# Patient Record
Sex: MALE | Race: WHITE | URBAN - METROPOLITAN AREA
[De-identification: names, ages, dates, MRNs, and addresses within clinical notes are randomized per-mention and may not be internally consistent; named-entity substitution may affect disease eponyms.]

---

## 2021-05-26 ENCOUNTER — RECORDS - HEALTHEAST (OUTPATIENT)
Dept: LAB | Facility: CLINIC | Age: 31
End: 2021-05-26

## 2021-05-26 LAB
SARS-COV-2 PCR COMMENT: NORMAL
SARS-COV-2 RNA SPEC QL NAA+PROBE: NEGATIVE
SARS-COV-2 VIRUS SPECIMEN SOURCE: NORMAL

## 2022-02-28 ENCOUNTER — ANCILLARY PROCEDURE (OUTPATIENT)
Dept: GENERAL RADIOLOGY | Facility: CLINIC | Age: 32
End: 2022-02-28
Attending: FAMILY MEDICINE
Payer: COMMERCIAL

## 2022-02-28 ENCOUNTER — OFFICE VISIT (OUTPATIENT)
Dept: FAMILY MEDICINE | Facility: CLINIC | Age: 32
End: 2022-02-28
Payer: COMMERCIAL

## 2022-02-28 ENCOUNTER — TELEPHONE (OUTPATIENT)
Dept: FAMILY MEDICINE | Facility: CLINIC | Age: 32
End: 2022-02-28

## 2022-02-28 VITALS
SYSTOLIC BLOOD PRESSURE: 146 MMHG | BODY MASS INDEX: 28.79 KG/M2 | HEART RATE: 76 BPM | HEIGHT: 68 IN | DIASTOLIC BLOOD PRESSURE: 72 MMHG | RESPIRATION RATE: 16 BRPM | WEIGHT: 190 LBS | TEMPERATURE: 97.9 F

## 2022-02-28 DIAGNOSIS — M25.572 PAIN IN JOINT INVOLVING ANKLE AND FOOT, LEFT: ICD-10-CM

## 2022-02-28 DIAGNOSIS — M25.572 PAIN IN JOINT INVOLVING ANKLE AND FOOT, LEFT: Primary | ICD-10-CM

## 2022-02-28 PROCEDURE — 99213 OFFICE O/P EST LOW 20 MIN: CPT | Performed by: FAMILY MEDICINE

## 2022-02-28 PROCEDURE — 73610 X-RAY EXAM OF ANKLE: CPT | Mod: LT | Performed by: RADIOLOGY

## 2022-02-28 RX ORDER — HYDROCODONE BITARTRATE AND ACETAMINOPHEN 5; 325 MG/1; MG/1
1 TABLET ORAL EVERY 6 HOURS PRN
Qty: 15 TABLET | Refills: 0 | Status: SHIPPED | OUTPATIENT
Start: 2022-02-28 | End: 2022-03-03

## 2022-02-28 ASSESSMENT — PAIN SCALES - GENERAL: PAINLEVEL: SEVERE PAIN (7)

## 2022-02-28 NOTE — LETTER
[facility]  [address] [facility] [address]  Phone: [phone]    February 28, 2022        Jf Valdez  [address] [facility] [address]          To whom it may concern:    RE: Jf Valdez    Patient was seen and treated today at our clinic.  Due to injury, Jf can only do sitting work. Minimal walking for the remainder of the week.    Please contact me for questions or concerns.      Sincerely,        Za Salazar MD

## 2022-02-28 NOTE — PROGRESS NOTES
"  Assessment & Plan     Pain in joint involving ankle and foot, left  Rest/ice/ibuprofen/ appears to be sprain, follow-up with sports med  - XR Ankle Left G/E 3 Views; Future  - HYDROcodone-acetaminophen (NORCO) 5-325 MG tablet; Take 1 tablet by mouth every 6 hours as needed for pain  - Orthopedic  Referral; Future             BMI:   Estimated body mass index is 28.89 kg/m  as calculated from the following:    Height as of this encounter: 1.727 m (5' 8\").    Weight as of this encounter: 86.2 kg (190 lb).   Weight management plan: Discussed healthy diet and exercise guidelines        No follow-ups on file.    Za Salazar MD  [facility]    Katelynn Rhoades is a 31 year old who presents for the following health issues     Musculoskeletal Problem    History of Present Illness     Reason for visit:  Left ankle  Symptom onset:  1-3 days ago    He eats 0-1 servings of fruits and vegetables daily.He consumes 1 sweetened beverage(s) daily.He exercises with enough effort to increase his heart rate 30 to 60 minutes per day.  He exercises with enough effort to increase his heart rate 5 days per week.   He is taking medications regularly.       Left ankle externally rotated.  Playing basketball  Hurts laterally  Has swelling and the start of bruising.   Unable to bear much weight on it  Did this last night      Review of Systems   Constitutional, HEENT, cardiovascular, pulmonary, gi and gu systems are negative, except as otherwise noted.      Objective    BP (!) 156/73   Pulse 76   Temp 97.9  F (36.6  C) (Oral)   Resp 16   Ht 1.727 m (5' 8\")   Wt 86.2 kg (190 lb)   BMI 28.89 kg/m    Body mass index is 28.89 kg/m .  Physical Exam   GENERAL: healthy, alert and no distress  MS: no gross musculoskeletal defects noted, laft lateral malleolus with pain to palpation just below/bruising and swelling noted, toes warm to touch    Xray - Reviewed and interpreted by me.  Left ankle: no " fracture noted

## 2022-02-28 NOTE — TELEPHONE ENCOUNTER
Forgot to give work note. Called and left a message to call back. Would like to know if he would like us to fax note. Made a copy and placed in mail.    Nadia Grant MA

## 2022-03-01 NOTE — PROGRESS NOTES
Assessment:      ICD-10-CM    1. Sprain of anterior talofibular ligament of left ankle, initial encounter  S93.492A Rolling Knee Walker Order   2. Sprain of calcaneofibular ligament of left ankle, initial encounter  S93.412A        Plan:  Orders Placed This Encounter   Procedures     Rolling Knee Walker Order       Discussed the etiology and treatment of the condition with the patient.  Imaging studies reviewed and discussed with the patient.  Discussed surgical and conservative options.  Significant lateral sprain, however severe edema & pain medially and with external rotation stress.  Would like to have him RICE and return next week for re-eval, see if MRI necessary    -Compression  -Knee scooter Rx  -Elevate  -NWB      Return:  Return in about 1 week (around 3/9/2022) for ankle sprain - :Left.    Radha Arora DPM              2  Chief Complaint:     Patient presents with:  Consult: left ankle     left ankle injury.    HPI:  Jf Valdez is a 31 year old year old male who presents for evaluation of ankle injury.    Date of injury- 02-27-22  Mechanism of injury- sports.  Occurred while playing basketball  Pain location- left ankle  Pain severity- 6/10, pain is improving.  Modifying factors- worsened by standing and improved by Rest, lying down and Norco.  Associated symptoms- redness.  Initial Treatment - NSAIDS.    Playing basketball on Sunday, jumped and came down, shows me he inverted the ankle.  Has a picture of his ankle swelling significantly on the lateral side immediately after injury.    Past Medical & Surgical History:  History reviewed. No pertinent past medical history.   No past surgical history on file.   No family history on file.     Social History:  ?  History   Smoking Status     Never Smoker   Smokeless Tobacco     Never Used     History   Drug Use Unknown     Social History    Substance and Sexual Activity      Alcohol use: Yes        Alcohol/week: 2.0 standard drinks         "Types: 2 Shots of liquor per week      Allergies:  ?   Allergies   Allergen Reactions     Pertussis Vaccines      Was told by his mother that he swelled up after given an injection with pertussis in it as a baby        Medications:    Current Outpatient Medications   Medication     HYDROcodone-acetaminophen (NORCO) 5-325 MG tablet     No current facility-administered medications for this visit.       Physical Exam:  ?  Vitals:  BP (!) 152/74 (BP Location: Left arm, Patient Position: Sitting, Cuff Size: Adult Regular)   Pulse 67   Ht 1.727 m (5' 8\")   Wt 86.2 kg (190 lb)   SpO2 99%   BMI 28.89 kg/m     General:  WD/WN, in NAD.  A&O x3.  Dermatologic:    Skin is intact, open lesions absent.   Bruising present lateral mainly.  Skin texture, turgor is normal.  Vascular:  Pulses palpable bilateral.  Digital capillary refill time normal bilateral.  Skin temperature is warmleft.  Generalized edema- none bilateral..  Focal edema- moderate ankle left..  Neurologic:    Gross sensation normal.  Gait and balance abnormal, NWB.  Musculoskeletal:  Maximal pain to palpation of peroneals just distal to retrofibular groove, left.  moderate pain to palpation of ATFL, CFL, medial ankle gutter, anterior deltoid, left  Mild pain to 1  Ankle ROM guarding, painful  Left.  External rotation stress stable, painful, L  Tib/Fib squeeze stable, not painful, L  Manual Muscle Testing of PB  painful  4/5  Left, PL painful, 5/5 L.    Patient is not able to bear weight on the injured extremity.  Patient is not able to walk on the injured extremity.    Imaging:  x-ray independently reviewed and interpreted by myself today.  Non-Weight-bearing views left ankle dated 2/28/22, reveal no fracture or degeneration, NWB films          "

## 2022-03-02 ENCOUNTER — OFFICE VISIT (OUTPATIENT)
Dept: PODIATRY | Facility: CLINIC | Age: 32
End: 2022-03-02
Attending: FAMILY MEDICINE
Payer: COMMERCIAL

## 2022-03-02 VITALS
SYSTOLIC BLOOD PRESSURE: 152 MMHG | OXYGEN SATURATION: 99 % | BODY MASS INDEX: 28.79 KG/M2 | HEART RATE: 67 BPM | HEIGHT: 68 IN | DIASTOLIC BLOOD PRESSURE: 74 MMHG | WEIGHT: 190 LBS

## 2022-03-02 DIAGNOSIS — S93.412A SPRAIN OF CALCANEOFIBULAR LIGAMENT OF LEFT ANKLE, INITIAL ENCOUNTER: ICD-10-CM

## 2022-03-02 DIAGNOSIS — S93.492A SPRAIN OF ANTERIOR TALOFIBULAR LIGAMENT OF LEFT ANKLE, INITIAL ENCOUNTER: Primary | ICD-10-CM

## 2022-03-02 PROCEDURE — 99203 OFFICE O/P NEW LOW 30 MIN: CPT | Performed by: PODIATRIST

## 2022-03-02 ASSESSMENT — PAIN SCALES - GENERAL: PAINLEVEL: SEVERE PAIN (6)

## 2022-03-02 NOTE — LETTER
3/2/2022         RE: Jf Valdez  [address] [facility] [address]        Dear Colleague,    Thank you for referring your patient, Jf Valdez, to the [facility]. Please see a copy of my visit note below.    Assessment:      ICD-10-CM    1. Sprain of anterior talofibular ligament of left ankle, initial encounter  S93.492A Rolling Knee Walker Order   2. Sprain of calcaneofibular ligament of left ankle, initial encounter  S93.412A        Plan:  Orders Placed This Encounter   Procedures     Rolling Knee Walker Order       Discussed the etiology and treatment of the condition with the patient.  Imaging studies reviewed and discussed with the patient.  Discussed surgical and conservative options.  Significant lateral sprain, however severe edema & pain medially and with external rotation stress.  Would like to have him RICE and return next week for re-eval, see if MRI necessary    -Compression  -Knee scooter Rx  -Elevate  -NWB      Return:  Return in about 1 week (around 3/9/2022) for ankle sprain - :Left.    Radha Arora DPM              2  Chief Complaint:     Patient presents with:  Consult: left ankle     left ankle injury.    HPI:  Jf Valdez is a 31 year old year old male who presents for evaluation of ankle injury.    Date of injury- 02-27-22  Mechanism of injury- sports.  Occurred while playing basketball  Pain location- left ankle  Pain severity- 6/10, pain is improving.  Modifying factors- worsened by standing and improved by Rest, lying down and Norco.  Associated symptoms- redness.  Initial Treatment - NSAIDS.    Playing basketball on Sunday, jumped and came down, shows me he inverted the ankle.  Has a picture of his ankle swelling significantly on the lateral side immediately after injury.    Past Medical & Surgical History:  History reviewed. No pertinent past medical history.   No past surgical history on file.   No family history on file.  "    Social History:  ?  History   Smoking Status     Never Smoker   Smokeless Tobacco     Never Used     History   Drug Use Unknown     Social History    Substance and Sexual Activity      Alcohol use: Yes        Alcohol/week: 2.0 standard drinks        Types: 2 Shots of liquor per week      Allergies:  ?   Allergies   Allergen Reactions     Pertussis Vaccines      Was told by his mother that he swelled up after given an injection with pertussis in it as a baby        Medications:    Current Outpatient Medications   Medication     HYDROcodone-acetaminophen (NORCO) 5-325 MG tablet     No current facility-administered medications for this visit.       Physical Exam:  ?  Vitals:  BP (!) 152/74 (BP Location: Left arm, Patient Position: Sitting, Cuff Size: Adult Regular)   Pulse 67   Ht 1.727 m (5' 8\")   Wt 86.2 kg (190 lb)   SpO2 99%   BMI 28.89 kg/m     General:  WD/WN, in NAD.  A&O x3.  Dermatologic:    Skin is intact, open lesions absent.   Bruising present lateral mainly.  Skin texture, turgor is normal.  Vascular:  Pulses palpable bilateral.  Digital capillary refill time normal bilateral.  Skin temperature is warmleft.  Generalized edema- none bilateral..  Focal edema- moderate ankle left..  Neurologic:    Gross sensation normal.  Gait and balance abnormal, NWB.  Musculoskeletal:  Maximal pain to palpation of peroneals just distal to retrofibular groove, left.  moderate pain to palpation of ATFL, CFL, medial ankle gutter, anterior deltoid, left  Mild pain to 1  Ankle ROM guarding, painful  Left.  External rotation stress stable, painful, L  Tib/Fib squeeze stable, not painful, L  Manual Muscle Testing of PB  painful  4/5  Left, PL painful, 5/5 L.    Patient is not able to bear weight on the injured extremity.  Patient is not able to walk on the injured extremity.    Imaging:  x-ray independently reviewed and interpreted by myself today.  Non-Weight-bearing views left ankle dated 2/28/22, reveal no fracture or " degeneration, NWB films              Again, thank you for allowing me to participate in the care of your patient.        Sincerely,        Radha Arora DPM

## 2022-03-02 NOTE — PATIENT INSTRUCTIONS
PATIENT INSTRUCTIONS - Podiatry / Foot & Ankle Surgery      Elevate  Compression  No weight for next 1 week      [facility]  [place]  [address]   [place] [address]  Ph: [phone]  Fax: [phone] [place] [facility]  [address]  [place] [address]  Ph: [phone]  Fax: [phone]   [place]  [address]   [place] [address]  Ph: [phone]  Fax: [phone] [place]  [address]  [place] [address]   Ph: [phone]    [place]  [address]  [place] [address]   Ph: [phone]  [place]  [address] [place] [address]   Ph: [phone]      [place]  [address]  [place] [address]  Ph: [phone]  [place]  [address]   [place] [address]  Ph: [phone]  Fax: [phone]       *OUTSIDE STORE OPTIONS*  [facility]   [address]  [place], [place] [id number] [phone] [facility] [place]  [address]   [place], [place] [address] [phone] [facility] [place]  [address]  [place] [address] [phone]     ** Alternative Knee Scooter Rental/Purchase: [facility] [place]  [phone]  [email or web address]

## 2022-03-02 NOTE — LETTER
[facility]  [address]  [place] [address]  Phone: [phone]    March 2, 2022        Jf Valdez  [address] [facility] [address]          To whom it may concern:      Patient was seen and treated today at our clinic for Left ankle/foot injury DOI: 2/27/2022.  When the patient returns to work, the following restrictions apply until next week 3/9/2022. Knee Scooter.     He will return 39/2022  to evaluate healing and determine future work restrictions.  Thank you.           While on narcotics the patient should avoid driving, operating machinery, climbing, and alcohol.        Please contact me for questions or concerns.      Sincerely,      Provider Electronic Signature (as below)    Radha Arora DPM

## 2022-03-09 ENCOUNTER — OFFICE VISIT (OUTPATIENT)
Dept: PODIATRY | Facility: CLINIC | Age: 32
End: 2022-03-09
Payer: COMMERCIAL

## 2022-03-09 VITALS
HEART RATE: 72 BPM | SYSTOLIC BLOOD PRESSURE: 109 MMHG | HEIGHT: 68 IN | BODY MASS INDEX: 28.79 KG/M2 | RESPIRATION RATE: 16 BRPM | DIASTOLIC BLOOD PRESSURE: 82 MMHG | WEIGHT: 190 LBS | OXYGEN SATURATION: 96 %

## 2022-03-09 DIAGNOSIS — S93.422A SPRAIN OF DELTOID LIGAMENT OF LEFT ANKLE, INITIAL ENCOUNTER: ICD-10-CM

## 2022-03-09 DIAGNOSIS — S93.492A SPRAIN OF ANTERIOR TALOFIBULAR LIGAMENT OF LEFT ANKLE, INITIAL ENCOUNTER: Primary | ICD-10-CM

## 2022-03-09 DIAGNOSIS — S93.412A SPRAIN OF CALCANEOFIBULAR LIGAMENT OF LEFT ANKLE, INITIAL ENCOUNTER: ICD-10-CM

## 2022-03-09 DIAGNOSIS — M76.72 PERONEAL TENDONITIS, LEFT: ICD-10-CM

## 2022-03-09 PROCEDURE — 99213 OFFICE O/P EST LOW 20 MIN: CPT | Performed by: PODIATRIST

## 2022-03-09 ASSESSMENT — PAIN SCALES - GENERAL: PAINLEVEL: MODERATE PAIN (5)

## 2022-03-09 NOTE — PATIENT INSTRUCTIONS
PATIENT INSTRUCTIONS - Podiatry / Foot & Ankle Surgery    Boot Immobilization:  Wear the boot at all times when walking or standing.  Wear the boot for 2-4 weeks, or 1 week beyond resolution of pain.  You may remove the boot when at rest for ankle and toe motion & for bathing/showering.  Do not drive in the boot; wear the boot to & from the car, then switch to a stiff soled shoe for driving.  You do not need to sleep in the boot.  Wear compression (ACE bandage or Tubigrip) under the boot to decrease swelling.  moinoimal activity in the boot until follow-up      Ankle brace dispensed today, transition to this after the boot     Physical Therapy  You have been referred to [facility].  Locations can be found online.  Please call to schedule an appointment:  [phone]      Do not attempt impact (running, jumping) until beyond 3 month apolonia

## 2022-03-09 NOTE — LETTER
3/9/2022         RE: Jf Valdez  [address] [facility] [address]        Dear Colleague,    Thank you for referring your patient, Jf Valdez, to the [facility]. Please see a copy of my visit note below.    Assessment:      ICD-10-CM    1. Sprain of anterior talofibular ligament of left ankle, initial encounter  S93.492A Ankle/Foot Bracing Supplies Order     Ankle/Foot Bracing Supplies Order     Physical Therapy Referral   2. Sprain of calcaneofibular ligament of left ankle, initial encounter  S93.412A Ankle/Foot Bracing Supplies Order     Ankle/Foot Bracing Supplies Order     Physical Therapy Referral   3. Sprain of deltoid ligament of left ankle, initial encounter  S93.422A Ankle/Foot Bracing Supplies Order     Ankle/Foot Bracing Supplies Order     Physical Therapy Referral   4. Peroneal tendonitis, left  M76.72 Ankle/Foot Bracing Supplies Order     Ankle/Foot Bracing Supplies Order     Physical Therapy Referral       Plan:  Orders Placed This Encounter   Procedures     Physical Therapy Referral     Ankle/Foot Bracing Supplies Order     Ankle/Foot Bracing Supplies Order       Discussed the etiology and treatment of the condition with the patient.  Imaging studies reviewed and discussed with the patient.  Discussed surgical and conservative options.  Significant lateral sprain, likely superficial deltoid injury but stress exams of deltoid stable today.    -Rx PT  -Boot dispensed per pt request  -ASO brace dispensed for after boot  -COmression under both  -Limit impact activity - for 3 mo    Discussed signs that would indicate need for return evaluation    Return:  Return in about 6 weeks (around 4/20/2022), or if symptoms worsen or fail to improve, for ankle sprain left.    Radha Arora DPM                Chief Complaint:     Patient presents with:  Follow Up: Left ankle     left ankle injury.    HPI:  Jf Valdez is a 31 year old year old male who  "presents for evaluation of ankle injury.    Date of injury- 02-27-22  Mechanism of injury- sports.  Occurred while playing basketball  Pain location- left ankle  Pain severity- 6/10, pain is improving.  Modifying factors- worsened by standing and improved by Rest, lying down and Norco.  Associated symptoms- redness.  Initial Treatment - NSAIDS.    Feeling much  Better today.  Swelling has come down.  Numbness is improving slowly.    Past Medical & Surgical History:  No past medical history on file.   No past surgical history on file.   No family history on file.     Social History:  ?  History   Smoking Status     Never Smoker   Smokeless Tobacco     Never Used     History   Drug Use Unknown     Social History    Substance and Sexual Activity      Alcohol use: Yes        Alcohol/week: 2.0 standard drinks        Types: 2 Shots of liquor per week      Allergies:  ?   Allergies   Allergen Reactions     Pertussis Vaccines      Was told by his mother that he swelled up after given an injection with pertussis in it as a baby        Medications:    No current outpatient medications on file.     No current facility-administered medications for this visit.       Physical Exam:  ?  Vitals:  /82   Pulse 72   Resp 16   Ht 1.727 m (5' 8\")   Wt 86.2 kg (190 lb)   SpO2 96%   BMI 28.89 kg/m     General:  WD/WN, in NAD.  A&O x3.  Dermatologic:    Skin is intact, open lesions absent.   Bruising present lateral mainly.  Skin texture, turgor is normal.  Vascular:  Pulses palpable bilateral.  Digital capillary refill time normal bilateral.  Skin temperature is warmleft.  Generalized edema- none bilateral..  Focal edema- moderate ankle left..  Neurologic:    Gross sensation normal.  Gait and balance abnormal, NWB.  Musculoskeletal:  Maximal pain to palpation of  ATFL, CFL, peroneals, anterior deltoid. left.  moderate pain to palpation of, medial ankle gutter, anterior deltoid, left  Ankle ROM smooth, painful, Left.  Anterior " drawer mildly increased, talar tilt stable, Left.  External rotation stress stable, pain free, L  Manual Muscle Testing of PB  painful  4/5  Left, PL painful, 5/5 L.    Patient is  able to bear weight on the injured extremity.  Patient is  able to walk on the injured extremity.    Imaging:  x-ray independently reviewed and interpreted by myself today.  Non-Weight-bearing views left ankle dated 2/28/22, reveal no fracture or degeneration, NWB films. Talar neck avulsion visible on MO in area of superfical deltoid insertion, likely superficial deltoid injury.            Again, thank you for allowing me to participate in the care of your patient.        Sincerely,        Radha Arora DPM

## 2022-03-09 NOTE — LETTER
[facility]  [address]  [place] [address]  Phone: [phone]    March 9, 2022        Jf Valdez  [address] [facility] [address]          To whom it may concern:      Patient was seen and treated today at our clinic for Left ankle/foot injury DOI: 2/27/2022.  When the patient returns to work, the following restrictions apply for the next six weeks. Knee Scooter/ boot fx, and ankle brace.  He will return in 6 weeks to evaluate healing and determine future work restrictions.  Thank you.           While on narcotics the patient should avoid driving, operating machinery, climbing, and alcohol.        Please contact me for questions or concerns.      Sincerely,      Provider Electronic Signature (as below)  Radha Arora DPM

## 2022-03-09 NOTE — PROGRESS NOTES
Assessment:      ICD-10-CM    1. Sprain of anterior talofibular ligament of left ankle, initial encounter  S93.492A Ankle/Foot Bracing Supplies Order     Ankle/Foot Bracing Supplies Order     Physical Therapy Referral   2. Sprain of calcaneofibular ligament of left ankle, initial encounter  S93.412A Ankle/Foot Bracing Supplies Order     Ankle/Foot Bracing Supplies Order     Physical Therapy Referral   3. Sprain of deltoid ligament of left ankle, initial encounter  S93.422A Ankle/Foot Bracing Supplies Order     Ankle/Foot Bracing Supplies Order     Physical Therapy Referral   4. Peroneal tendonitis, left  M76.72 Ankle/Foot Bracing Supplies Order     Ankle/Foot Bracing Supplies Order     Physical Therapy Referral       Plan:  Orders Placed This Encounter   Procedures     Physical Therapy Referral     Ankle/Foot Bracing Supplies Order     Ankle/Foot Bracing Supplies Order       Discussed the etiology and treatment of the condition with the patient.  Imaging studies reviewed and discussed with the patient.  Discussed surgical and conservative options.  Significant lateral sprain, likely superficial deltoid injury but stress exams of deltoid stable today.    -Rx PT  -Boot dispensed per pt request  -ASO brace dispensed for after boot  -COmression under both  -Limit impact activity - for 3 mo    Discussed signs that would indicate need for return evaluation    Return:  Return in about 6 weeks (around 4/20/2022), or if symptoms worsen or fail to improve, for ankle sprain left.    Radha Arora DPM                Chief Complaint:     Patient presents with:  Follow Up: Left ankle     left ankle injury.    HPI:  Jf Valdez is a 31 year old year old male who presents for evaluation of ankle injury.    Date of injury- 02-27-22  Mechanism of injury- sports.  Occurred while playing basketball  Pain location- left ankle  Pain severity- 6/10, pain is improving.  Modifying factors- worsened by standing and improved by  "Rest, lying down and Norco.  Associated symptoms- redness.  Initial Treatment - NSAIDS.    Feeling much  Better today.  Swelling has come down.  Numbness is improving slowly.    Past Medical & Surgical History:  No past medical history on file.   No past surgical history on file.   No family history on file.     Social History:  ?  History   Smoking Status     Never Smoker   Smokeless Tobacco     Never Used     History   Drug Use Unknown     Social History    Substance and Sexual Activity      Alcohol use: Yes        Alcohol/week: 2.0 standard drinks        Types: 2 Shots of liquor per week      Allergies:  ?   Allergies   Allergen Reactions     Pertussis Vaccines      Was told by his mother that he swelled up after given an injection with pertussis in it as a baby        Medications:    No current outpatient medications on file.     No current facility-administered medications for this visit.       Physical Exam:  ?  Vitals:  /82   Pulse 72   Resp 16   Ht 1.727 m (5' 8\")   Wt 86.2 kg (190 lb)   SpO2 96%   BMI 28.89 kg/m     General:  WD/WN, in NAD.  A&O x3.  Dermatologic:    Skin is intact, open lesions absent.   Bruising present lateral mainly.  Skin texture, turgor is normal.  Vascular:  Pulses palpable bilateral.  Digital capillary refill time normal bilateral.  Skin temperature is warmleft.  Generalized edema- none bilateral..  Focal edema- moderate ankle left..  Neurologic:    Gross sensation normal.  Gait and balance abnormal, NWB.  Musculoskeletal:  Maximal pain to palpation of  ATFL, CFL, peroneals, anterior deltoid. left.  moderate pain to palpation of, medial ankle gutter, anterior deltoid, left  Ankle ROM smooth, painful, Left.  Anterior drawer mildly increased, talar tilt stable, Left.  External rotation stress stable, pain free, L  Manual Muscle Testing of PB  painful  4/5  Left, PL painful, 5/5 L.    Patient is  able to bear weight on the injured extremity.  Patient is  able to walk on the " injured extremity.    Imaging:  x-ray independently reviewed and interpreted by myself today.  Non-Weight-bearing views left ankle dated 2/28/22, reveal no fracture or degeneration, NWB films. Talar neck avulsion visible on MO in area of superfical deltoid insertion, likely superficial deltoid injury.

## 2022-03-11 ENCOUNTER — MYC REFILL (OUTPATIENT)
Dept: FAMILY MEDICINE | Facility: CLINIC | Age: 32
End: 2022-03-11
Payer: COMMERCIAL

## 2022-03-11 DIAGNOSIS — S93.492A SPRAIN OF ANTERIOR TALOFIBULAR LIGAMENT OF LEFT ANKLE, INITIAL ENCOUNTER: Primary | ICD-10-CM

## 2022-03-11 DIAGNOSIS — M25.572 PAIN IN JOINT INVOLVING ANKLE AND FOOT, LEFT: ICD-10-CM

## 2022-03-11 RX ORDER — HYDROCODONE BITARTRATE AND ACETAMINOPHEN 5; 325 MG/1; MG/1
1 TABLET ORAL EVERY 6 HOURS PRN
Qty: 15 TABLET | Refills: 0 | OUTPATIENT
Start: 2022-03-11

## 2022-03-11 NOTE — TELEPHONE ENCOUNTER
I am no longer managing his ankle sprain. I saw him once  He needs to get refills from his podiatrist    Za Salazar MD

## 2022-03-11 NOTE — TELEPHONE ENCOUNTER
Pt calling regarding refill request below. RN advised request will be forwarded to provider to review and advise.  Pt states he was seen by Foot and Ankle department this week, but did not request a refill from that provider because he thought it was not appropriate to get refills from more than 1 provider.    Sally Sanchez, KRISTAN, RN

## 2022-03-12 ENCOUNTER — HEALTH MAINTENANCE LETTER (OUTPATIENT)
Age: 32
End: 2022-03-12

## 2022-03-24 ENCOUNTER — THERAPY VISIT (OUTPATIENT)
Dept: PHYSICAL THERAPY | Facility: CLINIC | Age: 32
End: 2022-03-24
Attending: PODIATRIST
Payer: COMMERCIAL

## 2022-03-24 DIAGNOSIS — S93.422A SPRAIN OF DELTOID LIGAMENT OF LEFT ANKLE, INITIAL ENCOUNTER: ICD-10-CM

## 2022-03-24 DIAGNOSIS — M76.72 PERONEAL TENDONITIS, LEFT: ICD-10-CM

## 2022-03-24 DIAGNOSIS — S93.492A SPRAIN OF ANTERIOR TALOFIBULAR LIGAMENT OF LEFT ANKLE, INITIAL ENCOUNTER: ICD-10-CM

## 2022-03-24 DIAGNOSIS — S93.412A SPRAIN OF CALCANEOFIBULAR LIGAMENT OF LEFT ANKLE, INITIAL ENCOUNTER: ICD-10-CM

## 2022-03-24 DIAGNOSIS — M25.572 PAIN IN JOINT INVOLVING ANKLE AND FOOT, LEFT: ICD-10-CM

## 2022-03-24 DIAGNOSIS — R26.9 IMPAIRED GAIT: ICD-10-CM

## 2022-03-24 PROCEDURE — 97161 PT EVAL LOW COMPLEX 20 MIN: CPT | Mod: GP | Performed by: PHYSICAL THERAPIST

## 2022-03-24 PROCEDURE — 97110 THERAPEUTIC EXERCISES: CPT | Mod: GP | Performed by: PHYSICAL THERAPIST

## 2022-03-24 NOTE — PROGRESS NOTES
Physical Therapy Initial Evaluation  Subjective:  The history is provided by the patient and medical records. No  was used.   Patient Health History  Jf Valdez being seen for L lateral ankle sprain.     Problem began: 2/27/2022.   Problem occurred: Playing basketball   Pain is reported as 5/10 on pain scale.  General health as reported by patient is good.  Pertinent medical history includes: numbness/tingling.   Red flags:  Calf pain-swelling-warmth (calf pain d/t condition; (-) signs for DVT).             Current occupation is ; lives with wife & 2 daughters ages 7 & 14, both play softball.   Primary job tasks include:  Lifting/carrying, prolonged standing, repetitive tasks and pushing/pulling.                  Therapist Generated HPI Evaluation  Problem details: Jf is a 32 y/o male referred to skilled therapy for evaluation and treatment of L ankle pain secondary to L lateral ankle sprain and damage to lateral and medial ankle ligaments. NANO: playing basketball and landed on inverted L foot, R foot landed on top of L medial ankle, causing injury to lateral and medial sides of the ankle. Patient has been prescribed a boot for 4 weeks and is instructed to transition to lace-up brace for 3 more weeks following conclusion of the boot. As of today, he has one more week in the boot. Patient is WBAT on his L foot with the boot on. He wears the boot majority of the day and takes it off at home. Only ambulates from [id number] without the boot. He uses a knee scooter at work as needed. Patient notes pain on dorsal aspect of ankle, and contributes it to uncomfortable positioning of the boot. Jf is an active individual and would like to be able to return to running activities and participating in sports with his two daughters..         Type of problem:  Left ankle.    This is a new condition.  Condition occurred with:  A wrong landing.  Where condition occurred:  during recreation/sport.  Patient reports pain:  Joint, anterior, medial, lateral and metatarsal heads (some tingling in ball of foot with stairs, but diminishes upon completion).  Pain is described as cramping and sharp and is intermittent.  Pain radiates to:  Foot.   Since onset symptoms are gradually improving.  Associated symptoms:  Numbness, tingling, edema and loss of motion/stiffness. Symptoms are exacerbated by ascending stairs, descending stairs, walking and weight bearing (DF)  and relieved by bracing/immobilizing and ice.  Special tests included:  X-ray (no fracture or degeneration; talar neck avulsion at superficial deltoid attachment).    Restrictions due to condition include:  Working in normal job with restrictions.                          Objective:    Gait:    Gait Type:  Antalgic   Assistive Devices:  CAM            Ankle/Foot Evaluation  ROM:    AROM:    Dorsiflexion:  Left:   5  Right:   5  Plantarflexion:  Left:  65    Right:  63  Inversion:  Left:  20 +pain     Right:  28  Eversion:  17 +pain     Right:  16        Strength:    Dorsiflexion:  Left: 5/5     Pain:   Right: 5/5   Pain:          Extension Great Toe:Left: 5/5  Pain:  Right: 5/5  Pain:                  PALPATION: Palpation of ankle: TTP along shaft of L 5th metatarsal.  Left ankle tenderness present at:  deltoid ligament; anterior talofibular ligament; posterior talofibular ligament and calcaneofibular ligament  Left ankle tenderness not present at:   gastroc/soleus; achilles tendon; medial malleolus or lateral malleolus    EDEMA:   Left ankle edema present at: 53  Left ankle edema not present at: forefoot  Right ankle edema present at:  50  Right ankle edema not present at:  forefoot      Figure 8 left: 53Figure 8 right: 50                                           Hip Evaluation    Hip Strength:    Flexion:   Left: 5/5   Pain:  Right: 5/5   Pain:                                                 Knee Evaluation:  ROM:             Strength:     Extension:  Left: 5/5   Pain:      Right: 5/5   Pain:  Flexion:  Left: 5/5   Pain:      Right: 5/5   Pain:                        General     ROS    Assessment/Plan:    Patient is a 31 year old male with left side ankle complaints.    Patient has the following significant findings with corresponding treatment plan.                Diagnosis 1:  L lateral ankle sprain  Pain -  hot/cold therapy, US, manual therapy, splint/taping/bracing/orthotics, self management, education and home program  Decreased ROM/flexibility - manual therapy, therapeutic exercise, therapeutic activity and home program  Edema - vasopneumatics, cold therapy and self management/home program  Impaired gait - gait training and home program  Impaired muscle performance - neuro re-education and home program  Decreased function - therapeutic activities and home program    Therapy Evaluation Codes:   1) History comprised of:   Personal factors that impact the plan of care:      Profession and activity level.    Comorbidity factors that impact the plan of care are:      None.     Medications impacting care: None.  2) Examination of Body Systems comprised of:   Body structures and functions that impact the plan of care:      Ankle and foot.   Activity limitations that impact the plan of care are:      Dressing, Jumping, Running, Sports, Squatting/kneeling, Stairs, Standing, Walking and Working.  3) Clinical presentation characteristics are:   Stable/Uncomplicated.  4) Decision-Making    Low complexity using standardized patient assessment instrument and/or measureable assessment of functional outcome.  Cumulative Therapy Evaluation is: Low complexity.    Previous and current functional limitations:  (See Goal Flow Sheet for this information)    Short term and Long term goals: (See Goal Flow Sheet for this information)     Communication ability:  Patient appears to be able to clearly communicate and understand verbal and written  communication and follow directions correctly.  Treatment Explanation - The following has been discussed with the patient:   RX ordered/plan of care  Anticipated outcomes  Possible risks and side effects  This patient would benefit from PT intervention to resume normal activities.   Rehab potential is excellent.    Frequency:  1 X week, once daily  Duration:  for 4 weeks, then 2x/month for 2 months  Discharge Plan:  Achieve all LTG.  Independent in home treatment program.  Return to previous functional level by discharge.  Reach maximal therapeutic benefit.    Please refer to the daily flowsheet for treatment today, total treatment time and time spent performing 1:1 timed codes.     Amanda Hilligoss, HARIT; Misti Coronado, SPT

## 2022-08-01 PROBLEM — M25.572 PAIN IN JOINT INVOLVING ANKLE AND FOOT, LEFT: Status: RESOLVED | Noted: 2022-03-24 | Resolved: 2022-08-01

## 2022-08-01 PROBLEM — R26.9 IMPAIRED GAIT: Status: RESOLVED | Noted: 2022-03-24 | Resolved: 2022-08-01

## 2022-09-28 ENCOUNTER — OFFICE VISIT (OUTPATIENT)
Dept: FAMILY MEDICINE | Facility: OTHER | Age: 32
End: 2022-09-28
Payer: COMMERCIAL

## 2022-09-28 VITALS
OXYGEN SATURATION: 100 % | RESPIRATION RATE: 16 BRPM | HEART RATE: 83 BPM | DIASTOLIC BLOOD PRESSURE: 78 MMHG | BODY MASS INDEX: 26.76 KG/M2 | WEIGHT: 176 LBS | TEMPERATURE: 96.9 F | SYSTOLIC BLOOD PRESSURE: 132 MMHG

## 2022-09-28 DIAGNOSIS — M25.511 CHRONIC PAIN OF BOTH SHOULDERS: Primary | ICD-10-CM

## 2022-09-28 DIAGNOSIS — M25.552 HIP PAIN, LEFT: ICD-10-CM

## 2022-09-28 DIAGNOSIS — M25.512 CHRONIC PAIN OF BOTH SHOULDERS: Primary | ICD-10-CM

## 2022-09-28 DIAGNOSIS — G89.29 CHRONIC PAIN OF BOTH SHOULDERS: Primary | ICD-10-CM

## 2022-09-28 DIAGNOSIS — S43.432A LABRAL TEAR OF SHOULDER, LEFT, INITIAL ENCOUNTER: ICD-10-CM

## 2022-09-28 PROCEDURE — 99214 OFFICE O/P EST MOD 30 MIN: CPT | Performed by: STUDENT IN AN ORGANIZED HEALTH CARE EDUCATION/TRAINING PROGRAM

## 2022-09-28 ASSESSMENT — PAIN SCALES - GENERAL: PAINLEVEL: SEVERE PAIN (6)

## 2022-09-28 NOTE — PROGRESS NOTES
Assessment & Plan     Chronic pain of both shoulders  Labral tear of shoulder, left, subsequent encounter, anterior-inferior  Hip pain, left  Subtle detachment of left rectus/abdominis/adductor aponeurosis  Please refer to MRI 2019 and 2021.  Ongoing left hip and bilateral shoulder pain in particular left side.  Previous injuries from sport related activities.  Has been in physical therapy for his left hip in the past.  Has some ongoing discomfort.  Has not done physical therapy for his left shoulder in the past.  We discussed about orthopedic referral, therapy, stretches, OTC treatments.  For now patient wishes to defer referrals for now, will trial some at home exercises/stretches with OTC treatments.  Would have a low threshold to send him to physical therapy though.  He will let me know.    I have spent 30 or more minutes face-to-face with the patient and coordinating care such as reviewing documentation, results, or having discussions over the phone.        ANDRE SCHUMACHER MD  [facility] COLLEEN Dias is a 31 year old, presenting for the following health issues:  Musculoskeletal Problem      Musculoskeletal Problem    History of Present Illness       Back Pain:  He presents for follow up of back pain. Patient's back pain is a new problem.    Original cause of back pain: other  First noticed back pain: 1-4 weeks ago  Patient feels back pain: constantlyLocation of back pain:  Right lower back, left lower back, right middle of back and left middle of back  Description of back pain: dull ache, sharp and stabbing  Back pain spreads: right buttocks, left buttocks, right thigh and left thigh    Since patient first noticed back pain, pain is: unchanged  Does back pain interfere with his job:  Yes  On a scale of 1-10 (10 being the worst), patient describes pain as:  7  What makes back pain worse: bending, certain positions, lying down, sitting, standing and twisting  Acupuncture:  not tried  Acetaminophen: not helpful  Activity or exercise: not helpful  Chiropractor:  Not tried  Cold: not tried  Heat: helpful  Massage: not tried  Muscle relaxants: helpful  NSAIDS: not helpful  Opioids: not tried  Physical Therapy: not tried  Rest: helpful  Steroid Injection: not tried  Stretching: not helpful  Surgery: not tried  TENS unit: not tried  Topical pain relievers: not tried  Other healthcare providers patient is seeing for back pain: None    He eats 0-1 servings of fruits and vegetables daily.He consumes 1 sweetened beverage(s) daily.He exercises with enough effort to increase his heart rate 30 to 60 minutes per day.  He exercises with enough effort to increase his heart rate 4 days per week.   He is taking medications regularly.         Review of Systems   Constitutional, HEENT, cardiovascular, pulmonary, gi and gu systems are negative, except as otherwise noted.      Objective    There were no vitals taken for this visit.  There is no height or weight on file to calculate BMI.  Physical Exam  Vitals and nursing note reviewed.   Constitutional:       General: He is not in acute distress.     Appearance: Normal appearance. He is not ill-appearing, toxic-appearing or diaphoretic.   HENT:      Head: Normocephalic and atraumatic.      Right Ear: Tympanic membrane, ear canal and external ear normal. There is no impacted cerumen.      Left Ear: Tympanic membrane, ear canal and external ear normal. There is no impacted cerumen.      Nose: Nose normal. No congestion or rhinorrhea.      Mouth/Throat:      Mouth: Mucous membranes are moist.      Pharynx: Oropharynx is clear. No oropharyngeal exudate or posterior oropharyngeal erythema.   Eyes:      General:         Right eye: No discharge.         Left eye: No discharge.      Extraocular Movements: Extraocular movements intact.      Conjunctiva/sclera: Conjunctivae normal.      Pupils: Pupils are equal, round, and reactive to light.   Cardiovascular:       Rate and Rhythm: Normal rate and regular rhythm.      Heart sounds: No murmur heard.  Pulmonary:      Effort: Pulmonary effort is normal. No respiratory distress.      Breath sounds: Normal breath sounds.   Musculoskeletal:         General: Normal range of motion.      Cervical back: Normal range of motion and neck supple.      Comments: Negative logroll left side.  Normal range of motion of left hip.  Some tenderness over the bilateral SI joints.  Slight tenderness over the left greater trochanter.  Normal sensation of bilateral lower extremities and symmetric.  2+ patellar and Achilles reflexes.   Lymphadenopathy:      Cervical: No cervical adenopathy.   Skin:     General: Skin is warm.   Neurological:      Mental Status: He is alert and oriented to person, place, and time.   Psychiatric:         Mood and Affect: Mood normal.         Behavior: Behavior normal.         Thought Content: Thought content normal.         Judgment: Judgment normal.

## 2022-09-28 NOTE — PATIENT INSTRUCTIONS
Tylenol and ibuprofen as needed  Ice (20 min at a time)  Any topicals that are over the counter  (Voltaren gel, Aspercreme, etc...)  Chiropractic

## 2023-03-29 ENCOUNTER — OFFICE VISIT (OUTPATIENT)
Dept: FAMILY MEDICINE | Facility: CLINIC | Age: 33
End: 2023-03-29
Payer: COMMERCIAL

## 2023-03-29 VITALS
WEIGHT: 178 LBS | DIASTOLIC BLOOD PRESSURE: 84 MMHG | BODY MASS INDEX: 26.98 KG/M2 | RESPIRATION RATE: 20 BRPM | OXYGEN SATURATION: 98 % | SYSTOLIC BLOOD PRESSURE: 131 MMHG | TEMPERATURE: 97.4 F | HEART RATE: 103 BPM | HEIGHT: 68 IN

## 2023-03-29 DIAGNOSIS — H92.01 EAR DISCOMFORT, RIGHT: Primary | ICD-10-CM

## 2023-03-29 PROCEDURE — 99213 OFFICE O/P EST LOW 20 MIN: CPT | Performed by: PHYSICIAN ASSISTANT

## 2023-03-29 ASSESSMENT — PAIN SCALES - GENERAL: PAINLEVEL: NO PAIN (0)

## 2023-03-29 NOTE — PROGRESS NOTES
"  Assessment & Plan       ICD-10-CM    1. Ear discomfort, right  H92.01       Talk to patient about his concerns I had my MA irrigate his right ear out after that I did not see any hair in his ear.  It is possible that was causing him his symptoms we will have to wait and see you.  We talked about possible eustachian tube dysfunction symptoms also.  He is get a monitor this over the next month if his symptoms persist he will follow-up at that time.      Lew Posey PA-C  [facility]   Arun is a 32 year old, presenting for the following health issues:  Ear Problem    Additional Questions 3/29/2023   Roomed by Yomaira MOMIN   Accompanied by Self     Patient Reported Additional Medications 3/29/2023   Patient reports taking the following new medications no change     History of Present Illness       Reason for visit:  Ear  Symptom onset:  1-2 weeks ago  Symptoms include:  Random noise at random times  Symptom intensity:  Mild  Symptom progression:  Staying the same  Had these symptoms before:  No  What makes it worse:  No    He eats 0-1 servings of fruits and vegetables daily.He consumes 0 sweetened beverage(s) daily.He exercises with enough effort to increase his heart rate 30 to 60 minutes per day.  He exercises with enough effort to increase his heart rate 5 days per week.   He is taking medications regularly.   started with \"bad cold\" about 4-6 wks ago and then off and on vibration sensation in right ear.   Increase with bending over.   No pain, no dizziness. occ feels muffled.   Otherwise feels fine.   History of PE tubes when he was younger.     Review of Systems   Constitutional, HEENT, cardiovascular, pulmonary, gi and gu systems are negative, except as otherwise noted.      Objective    /84   Pulse 103   Temp 97.4  F (36.3  C) (Tympanic)   Resp 20   Ht 1.727 m (5' 8\")   Wt 80.7 kg (178 lb)   SpO2 98%   BMI 27.06 kg/m    Body mass index is 27.06 " kg/m .  Physical Exam   GENERAL: healthy, alert and no distress  EYES: Eyes grossly normal to inspection, PERRL and conjunctivae and sclerae normal  HENT: ear canals and TM's normal, nose and mouth without ulcers or lesions-I do see a small piece of hair red next to his right TM.  NECK: no adenopathy, no asymmetry, masses, or scars and thyroid normal to palpation  RESP: lungs clear to auscultation - no rales, rhonchi or wheezes  CV: regular rate and rhythm, normal S1 S2, no S3 or S4, no murmur, click or rub, no peripheral edema and peripheral pulses strong

## 2023-04-23 ENCOUNTER — HEALTH MAINTENANCE LETTER (OUTPATIENT)
Age: 33
End: 2023-04-23

## 2023-06-05 NOTE — PROGRESS NOTES
"    Assessment & Plan     Chronic throat clearing  Ongoing  - Basic metabolic panel  (Ca, Cl, CO2, Creat, Gluc, K, Na, BUN); Future  - CBC with platelets and differential; Future  - Basic metabolic panel  (Ca, Cl, CO2, Creat, Gluc, K, Na, BUN)  - CBC with platelets and differential  Discussed multiple modes of etiology possible including nervous tic, allergies, infection/inflammation, chemicals, reflux, etc and he will continue to stay away from lemonade, smoking, drinking and will stay up on water. To take mucinex for mucous and tylenol for pain/swelling as cannot take ibuprofen for ear ringing.              BMI:   Estimated body mass index is 25.54 kg/m  as calculated from the following:    Height as of 3/29/23: 1.727 m (5' 8\").    Weight as of this encounter: 76.2 kg (168 lb).   Weight management plan: Discussed healthy diet and exercise guidelines    Follow up if not improving or if worsening     [facility]INCENT TINSLEY [facility]   Arun is a 32 year old, presenting for the following health issues:  Gastrophageal Reflux        3/29/2023     8:03 AM   Additional Questions   Roomed by Yomaira MOMIN   Accompanied by Self             History of Present Illness       Reason for visit:  Constantly clearing throat  Symptom onset:  3-4 weeks ago    He eats 0-1 servings of fruits and vegetables daily.He consumes 0 sweetened beverage(s) daily.He exercises with enough effort to increase his heart rate 20 to 29 minutes per day.  He exercises with enough effort to increase his heart rate 4 days per week.   He is taking medications regularly.               Review of Systems   Constitutional, HEENT, cardiovascular, pulmonary, gi and gu systems are negative, except as otherwise noted.      Objective    /79   Pulse 87   Temp 97.5  F (36.4  C) (Tympanic)   Resp 14   Wt 76.2 kg (168 lb)   SpO2 98%   BMI 25.54 kg/m    Body mass index is 25.54 kg/m .       Physical Exam   GENERAL: healthy, " alert and no distress  EYES: Eyes grossly normal to inspection, PERRL and conjunctivae and sclerae normal  HENT: ear canals and TM's normal, nose and mouth without ulcers or lesions  THROAT: Erythema and inflammation back of throat without exudate   NECK: no adenopathy, no asymmetry, masses, or scars and thyroid normal to palpation  RESP: lungs clear to auscultation - no rales, rhonchi or wheezes  CV: regular rate and rhythm, normal S1 S2, no S3 or S4, no murmur, click or rub, no peripheral edema and peripheral pulses strong  ABDOMEN: soft, nontender, no hepatosplenomegaly, no masses and bowel sounds normal  MS: no gross musculoskeletal defects noted, no edema

## 2023-06-08 ENCOUNTER — OFFICE VISIT (OUTPATIENT)
Dept: FAMILY MEDICINE | Facility: CLINIC | Age: 33
End: 2023-06-08
Payer: COMMERCIAL

## 2023-06-08 VITALS
DIASTOLIC BLOOD PRESSURE: 79 MMHG | OXYGEN SATURATION: 98 % | SYSTOLIC BLOOD PRESSURE: 137 MMHG | HEART RATE: 87 BPM | WEIGHT: 168 LBS | BODY MASS INDEX: 25.54 KG/M2 | RESPIRATION RATE: 14 BRPM | TEMPERATURE: 97.5 F

## 2023-06-08 DIAGNOSIS — R09.89 CHRONIC THROAT CLEARING: Primary | ICD-10-CM

## 2023-06-08 LAB
ANION GAP SERPL CALCULATED.3IONS-SCNC: 11 MMOL/L (ref 7–15)
BASOPHILS # BLD AUTO: 0 10E3/UL (ref 0–0.2)
BASOPHILS NFR BLD AUTO: 1 %
BUN SERPL-MCNC: 19.5 MG/DL (ref 6–20)
CALCIUM SERPL-MCNC: 9.6 MG/DL (ref 8.6–10)
CHLORIDE SERPL-SCNC: 103 MMOL/L (ref 98–107)
CREAT SERPL-MCNC: 1.04 MG/DL (ref 0.67–1.17)
DEPRECATED HCO3 PLAS-SCNC: 25 MMOL/L (ref 22–29)
EOSINOPHIL # BLD AUTO: 0.2 10E3/UL (ref 0–0.7)
EOSINOPHIL NFR BLD AUTO: 3 %
ERYTHROCYTE [DISTWIDTH] IN BLOOD BY AUTOMATED COUNT: 11.5 % (ref 10–15)
GFR SERPL CREATININE-BSD FRML MDRD: >90 ML/MIN/1.73M2
GLUCOSE SERPL-MCNC: 112 MG/DL (ref 70–99)
HCT VFR BLD AUTO: 47.4 % (ref 40–53)
HGB BLD-MCNC: 16.6 G/DL (ref 13.3–17.7)
IMM GRANULOCYTES # BLD: 0 10E3/UL
IMM GRANULOCYTES NFR BLD: 0 %
LYMPHOCYTES # BLD AUTO: 2.1 10E3/UL (ref 0.8–5.3)
LYMPHOCYTES NFR BLD AUTO: 36 %
MCH RBC QN AUTO: 33.1 PG (ref 26.5–33)
MCHC RBC AUTO-ENTMCNC: 35 G/DL (ref 31.5–36.5)
MCV RBC AUTO: 95 FL (ref 78–100)
MONOCYTES # BLD AUTO: 0.4 10E3/UL (ref 0–1.3)
MONOCYTES NFR BLD AUTO: 8 %
NEUTROPHILS # BLD AUTO: 3 10E3/UL (ref 1.6–8.3)
NEUTROPHILS NFR BLD AUTO: 52 %
PLATELET # BLD AUTO: 249 10E3/UL (ref 150–450)
POTASSIUM SERPL-SCNC: 4.4 MMOL/L (ref 3.4–5.3)
RBC # BLD AUTO: 5.01 10E6/UL (ref 4.4–5.9)
SODIUM SERPL-SCNC: 139 MMOL/L (ref 136–145)
WBC # BLD AUTO: 5.7 10E3/UL (ref 4–11)

## 2023-06-08 PROCEDURE — 99213 OFFICE O/P EST LOW 20 MIN: CPT | Performed by: PHYSICIAN ASSISTANT

## 2023-06-08 PROCEDURE — 80048 BASIC METABOLIC PNL TOTAL CA: CPT | Performed by: PHYSICIAN ASSISTANT

## 2023-06-08 PROCEDURE — 36415 COLL VENOUS BLD VENIPUNCTURE: CPT | Performed by: PHYSICIAN ASSISTANT

## 2023-06-08 PROCEDURE — 85025 COMPLETE CBC W/AUTO DIFF WBC: CPT | Performed by: PHYSICIAN ASSISTANT

## 2023-12-12 ENCOUNTER — OFFICE VISIT (OUTPATIENT)
Dept: FAMILY MEDICINE | Facility: CLINIC | Age: 33
End: 2023-12-12
Payer: COMMERCIAL

## 2023-12-12 VITALS
WEIGHT: 187 LBS | OXYGEN SATURATION: 98 % | DIASTOLIC BLOOD PRESSURE: 76 MMHG | BODY MASS INDEX: 28.34 KG/M2 | HEIGHT: 68 IN | SYSTOLIC BLOOD PRESSURE: 139 MMHG | RESPIRATION RATE: 16 BRPM | TEMPERATURE: 96.6 F | HEART RATE: 84 BPM

## 2023-12-12 DIAGNOSIS — Z30.09 VASECTOMY EVALUATION: Primary | ICD-10-CM

## 2023-12-12 DIAGNOSIS — R20.2 PARESTHESIA: ICD-10-CM

## 2023-12-12 PROCEDURE — 99213 OFFICE O/P EST LOW 20 MIN: CPT | Performed by: PHYSICIAN ASSISTANT

## 2023-12-12 ASSESSMENT — ENCOUNTER SYMPTOMS
ARTHRALGIAS: 0
DIZZINESS: 0
NERVOUS/ANXIOUS: 0
SORE THROAT: 0
PARESTHESIAS: 0
MYALGIAS: 0
FREQUENCY: 0
ABDOMINAL PAIN: 0
FEVER: 0
HEMATURIA: 0
WEAKNESS: 0
HEARTBURN: 0
HEADACHES: 0
DIARRHEA: 0
PALPITATIONS: 0
CONSTIPATION: 0
EYE PAIN: 0
COUGH: 0
HEMATOCHEZIA: 0
DYSURIA: 0
SHORTNESS OF BREATH: 0
JOINT SWELLING: 0
NAUSEA: 0
CHILLS: 0

## 2023-12-12 ASSESSMENT — PAIN SCALES - GENERAL: PAINLEVEL: NO PAIN (0)

## 2023-12-12 NOTE — PATIENT INSTRUCTIONS
Information for your Vasectomy.    Please eat something before your procedure.     Please arrive about 15 minutes before your scheduled time.     It is ok to take some tylenol 1-2 hours prior to your procedure.     Please wear warm clothing.    Procedure takes about 1 hr but plan on being at the clinic about 1.5 hours.    Feel free to wear compression shorts/ or arthletic supporter for comfort.     Plan on very light activity for 1 wk with no lifting more that 20 lbs.     I recommend taking 2 days to recline with Icing 20 mins every couple yours.     Ok to use: Ibuprofen 600-800 mg three times daily or Aleve twice daily and/ or Tylenol 1-2 tabs po q4h as needed.    Abstain  from intercourse for about 1 wk so you can heal.    You can shower normally the next day. Just be gentle around that area.     You are not consider sterile until you leave a semen sample that doesn't contain sperm. (usually done about 3 months after vasectomy)    You will need to ejaculate at least 20 times between your surgery and the first sample.     Your may go home after procedure is complete.  There may be some pain in your groin for 3 to 4 days after the procedure.  You may have some blood or yellow liquid that loses from the incision sites.  He may have some swelling and bruising.  This is all normal.    Sutures will dissolve on their own and that can take a week or 2.    Watch for signs of infection which may include fever, pussy discharge, increased pain, swelling or redness.  Please contact us if this occurs.    Feel free to reach out to me with any questions that you have.     It is important that you rest for the first 48 hours and keep your activities minimal.        Thanks,  Lew Posey PA-C

## 2023-12-12 NOTE — PROGRESS NOTES
"SUBJECTIVE:  The patient presents for discussion of vasectomy.  The procedure was explained in detail using diagram from the vasectomy pamphlet published by [facility].  The permanency and effactiveness of this operation were explained in detail, including casectomy failure rate, bleeding, infection, scarring, chronic pain and epididymititis.  The patient was told to stay at bedrest for 48-72 hours, no heavy lifting for one week and to refrain from sex for 1 week.  The patient was also told to have a post operative sperm count at 3 months.  He should have another birth control until he has a sample that is infertile.      We next discussed the risks of vasectomy. The risks discussed included:    -Bleeding at the time of the procedure or later including hematoma development.  -Infection  -Postoperative discomfort  -Development of a sperm granuloma which is a lump that can form at the site of the transection of the vas deferens.  -Sperm buildup in the testicles that may cause a sensation of congestion or discomfort. which is usually responsive to a non steroidal anti-inflammatory drug.  -Epididymitis can also occur and can cause scrotal discomfort.  -Reconnection of the vas deferans which can occur in up to 1 in 2000 cases per the literature.  -Development of sperm antibodies which may leave a man infertile despite having a reversal of the vasectomy later.  - Long term testicular discomfort which is very rare.    Also states a couple months ago he was playing softball and slid and jammed his toe into his shoe he said hurt for about 5 minutes then he was fine but he has noticed a little numb area on his toe up.  No continued pain just continued numbness .  No previous back injuries.    OBJECTIVE:  On exam, this is a well-developed, well-nourished white male.  Weight is 187 lbs 0 oz.  Height is   Ht Readings from Last 2 Encounters:   12/12/23 1.727 m (5' 8\")   03/29/23 1.727 m (5' 8\")       Exam reveals a " normal circumcised penis, no lesions.  Testes are descended bilaterally. Both vas deferens were easily identified by palpation.  No abnormalities were noted. Exam was limited to the genitalia    Examination of his left great toe he has full range of motion no tenderness.  He has a subjective numbness in the distal medial aspect of his first distal phalanx.  No deformity noted.    ASSESSMENT:  Vasectomy evaluation  Paresthesia of toe: Patient reassurance.  Warning signs were discussed follow-up as needed.  PLAN:  He will schedule a vasectomy at his convenience. He was asked to wear warm layers of clothes on top and to wear warm socks.  He is aware that he will need to take several days off from work and any physical activity and essentially rest for two days with ice packs and ibuprofen for discomfort. He was also informed that he will need to bring a semen sample in 3 months to make sure that he has cleared the urinary tract  of any residual sperm and to make sure that he is sterile. He was informed that he should continue to use contraception until we have proven that he is sterile.    Lew Posey PA-C

## 2024-05-17 ENCOUNTER — OFFICE VISIT (OUTPATIENT)
Dept: FAMILY MEDICINE | Facility: CLINIC | Age: 34
End: 2024-05-17
Payer: COMMERCIAL

## 2024-05-17 ENCOUNTER — ANCILLARY PROCEDURE (OUTPATIENT)
Dept: GENERAL RADIOLOGY | Facility: CLINIC | Age: 34
End: 2024-05-17
Attending: PHYSICIAN ASSISTANT
Payer: COMMERCIAL

## 2024-05-17 VITALS
RESPIRATION RATE: 16 BRPM | HEIGHT: 68 IN | OXYGEN SATURATION: 99 % | SYSTOLIC BLOOD PRESSURE: 113 MMHG | BODY MASS INDEX: 27.58 KG/M2 | WEIGHT: 182 LBS | DIASTOLIC BLOOD PRESSURE: 62 MMHG | TEMPERATURE: 96.4 F | HEART RATE: 71 BPM

## 2024-05-17 DIAGNOSIS — G89.29 CHRONIC PAIN OF RIGHT KNEE: Primary | ICD-10-CM

## 2024-05-17 DIAGNOSIS — M25.561 CHRONIC PAIN OF RIGHT KNEE: Primary | ICD-10-CM

## 2024-05-17 PROCEDURE — 99214 OFFICE O/P EST MOD 30 MIN: CPT | Performed by: PHYSICIAN ASSISTANT

## 2024-05-17 PROCEDURE — 73562 X-RAY EXAM OF KNEE 3: CPT | Mod: TC | Performed by: RADIOLOGY

## 2024-05-17 ASSESSMENT — PAIN SCALES - GENERAL: PAINLEVEL: WORST PAIN (10)

## 2024-05-17 NOTE — PROGRESS NOTES
"  Assessment & Plan       ICD-10-CM    1. Chronic pain of right knee  M25.561 XR Knee Right 3 Views    G89.29 Orthopedic  Referral     MR Knee Right w/o Contrast      Talk to patient about his concerns I suspect meniscal pathology we will update an MRI and have him follow-up with orthopedics for second opinion.  We talked about activity modification and conservative management at this time.  Warning signs were discussed.        Katelynn Dias is a 33 year old, presenting for the following health issues:  Knee Pain    History of Present Illness       Reason for visit:  Knee pain  Symptom onset:  More than a month  Symptoms include:  Pain and popping  Symptom intensity:  Moderate  Symptom progression:  Worsening  Had these symptoms before:  Yes  Has tried/received treatment for these symptoms:  No  What makes it worse:  Being on my knees  What makes it better:  Standing    He eats 0-1 servings of fruits and vegetables daily.He consumes 1 sweetened beverage(s) daily.He exercises with enough effort to increase his heart rate 60 or more minutes per day.  He exercises with enough effort to increase his heart rate 4 days per week.   He is taking medications regularly.  Right knee pain. When getting on his knees he gets an intense pressure and then a pop.   Very painful for about 10 minutes then better.   May have injury knee playing football for fun over 10 yrs. Pain got better with rest.     Now gradual increase pain in the last year. Worse in the last couple months. No numbness/tingling  Works construction.         Review of Systems  Constitutional, HEENT, cardiovascular, pulmonary, gi and gu systems are negative, except as otherwise noted.      Objective    /62   Pulse 71   Temp (!) 96.4  F (35.8  C) (Tympanic)   Resp 16   Ht 1.727 m (5' 8\")   Wt 82.6 kg (182 lb)   SpO2 99%   BMI 27.67 kg/m    Body mass index is 27.67 kg/m .  Physical Exam   GENERAL: alert and no distress  Examination of his " right knee he does not have any effusion.  He has full range of motion.  He has medial greater than lateral joint line tenderness.  His ligament ligaments appear to be stable on valgus varus and Lachman's.  His calves are soft nontender is neurovascular intact distally.  He has a negative Nick's on exam today.    X-ray right knee: neg. pending radiology         Signed Electronically by: Lew Posey PA-C

## 2024-06-04 ENCOUNTER — ANCILLARY PROCEDURE (OUTPATIENT)
Dept: MRI IMAGING | Facility: CLINIC | Age: 34
End: 2024-06-04
Attending: PHYSICIAN ASSISTANT
Payer: COMMERCIAL

## 2024-06-04 DIAGNOSIS — G89.29 CHRONIC PAIN OF RIGHT KNEE: ICD-10-CM

## 2024-06-04 DIAGNOSIS — M25.561 CHRONIC PAIN OF RIGHT KNEE: ICD-10-CM

## 2024-06-04 PROCEDURE — 73721 MRI JNT OF LWR EXTRE W/O DYE: CPT | Mod: RT | Performed by: RADIOLOGY

## 2024-06-05 NOTE — RESULT ENCOUNTER NOTE
Mr. Valdez,    Your MRI was read by the radiologist.   IMPRESSION:  No findings to suggest internal derangement in the right knee joint.  The anterior and posterior cruciate ligaments, medial and lateral  supporting structures, and bilateral menisci are intact. Subtle focal  area of grade 2 cartilage loss along the posterior weightbearing  surface of the medial femoral condyle.    Follow up  with orthopedics for a 2nd opinion.     Please contact the clinic if you have additional questions.  Thank you.    Sincerely,    Lew Posey PA-C

## 2024-06-29 ENCOUNTER — HEALTH MAINTENANCE LETTER (OUTPATIENT)
Age: 34
End: 2024-06-29

## 2024-07-11 ENCOUNTER — OFFICE VISIT (OUTPATIENT)
Dept: FAMILY MEDICINE | Facility: CLINIC | Age: 34
End: 2024-07-11
Payer: COMMERCIAL

## 2024-07-11 VITALS
TEMPERATURE: 96.7 F | RESPIRATION RATE: 16 BRPM | DIASTOLIC BLOOD PRESSURE: 83 MMHG | SYSTOLIC BLOOD PRESSURE: 135 MMHG | BODY MASS INDEX: 25.18 KG/M2 | HEART RATE: 66 BPM | WEIGHT: 170 LBS | OXYGEN SATURATION: 99 % | HEIGHT: 69 IN

## 2024-07-11 DIAGNOSIS — S76.012A STRAIN OF FLEXOR MUSCLE OF LEFT HIP, INITIAL ENCOUNTER: Primary | ICD-10-CM

## 2024-07-11 PROCEDURE — 99213 OFFICE O/P EST LOW 20 MIN: CPT | Performed by: PHYSICIAN ASSISTANT

## 2024-07-11 ASSESSMENT — PAIN SCALES - GENERAL: PAINLEVEL: SEVERE PAIN (7)

## 2024-07-11 NOTE — PROGRESS NOTES
"  Assessment & Plan     Strain of flexor muscle of left hip, initial encounter  He will need a referral to Orthopedics once again.   Previous PRP treatment and tear of this muscle 2019.   He is resting and treating with conservative measures.   - Orthopedic  Referral; Future          BMI  Estimated body mass index is 25.1 kg/m  as calculated from the following:    Height as of this encounter: 1.753 m (5' 9\").    Weight as of this encounter: 77.1 kg (170 lb).             Subjective   Arun is a 33 year old, presenting for the following health issues:  Hip Pain (Left hip 3-4 weeks ago)        7/11/2024     9:34 AM   Additional Questions   Roomed by Vicente MARTIN MA     History of Present Illness       Reason for visit:  Hip  Symptom onset:  3-4 weeks ago    He eats 2-3 servings of fruits and vegetables daily.He consumes 1 sweetened beverage(s) daily.He exercises with enough effort to increase his heart rate 30 to 60 minutes per day.  He exercises with enough effort to increase his heart rate 5 days per week.   He is taking medications regularly.       Arun is here today for left hip injury.   He had a previous tear of his hip flexor and treated with PRP injection. He was recently playing softball and felt a pop in his hip and now having pain in the same area once again.                 Review of Systems  Constitutional, neuro, ENT, endocrine, pulmonary, cardiac, gastrointestinal, genitourinary, musculoskeletal, integument and psychiatric systems are negative, except as otherwise noted.      Objective    /83   Pulse 66   Temp (!) 96.7  F (35.9  C) (Tympanic)   Resp 16   Ht 1.753 m (5' 9\")   Wt 77.1 kg (170 lb)   SpO2 99%   BMI 25.10 kg/m    Body mass index is 25.1 kg/m .  Physical Exam   GENERAL: alert and no distress  MS: limping. Limited due to pain / difficulty with flexion at the hip  PSYCH: mentation appears normal, affect normal/bright            Signed Electronically by: Kristen M. Kehr, PA-C    "

## 2024-07-25 ENCOUNTER — E-VISIT (OUTPATIENT)
Dept: FAMILY MEDICINE | Facility: CLINIC | Age: 34
End: 2024-07-25
Payer: COMMERCIAL

## 2024-07-25 DIAGNOSIS — R69 DIAGNOSIS DEFERRED: Primary | ICD-10-CM

## 2024-07-25 PROCEDURE — 99207 PR NON-BILLABLE SERV PER CHARTING: CPT | Performed by: PHYSICIAN ASSISTANT

## 2024-08-07 ENCOUNTER — OFFICE VISIT (OUTPATIENT)
Dept: FAMILY MEDICINE | Facility: CLINIC | Age: 34
End: 2024-08-07
Payer: COMMERCIAL

## 2024-08-07 VITALS
WEIGHT: 169 LBS | SYSTOLIC BLOOD PRESSURE: 139 MMHG | HEART RATE: 76 BPM | RESPIRATION RATE: 16 BRPM | DIASTOLIC BLOOD PRESSURE: 89 MMHG | BODY MASS INDEX: 25.61 KG/M2 | HEIGHT: 68 IN | TEMPERATURE: 97.8 F | OXYGEN SATURATION: 98 %

## 2024-08-07 DIAGNOSIS — J32.9 SINUSITIS, UNSPECIFIED CHRONICITY, UNSPECIFIED LOCATION: Primary | ICD-10-CM

## 2024-08-07 DIAGNOSIS — G44.209 TENSION HEADACHE: ICD-10-CM

## 2024-08-07 DIAGNOSIS — F41.9 ANXIETY: ICD-10-CM

## 2024-08-07 PROCEDURE — 99213 OFFICE O/P EST LOW 20 MIN: CPT | Performed by: PHYSICIAN ASSISTANT

## 2024-08-07 PROCEDURE — G2211 COMPLEX E/M VISIT ADD ON: HCPCS | Performed by: PHYSICIAN ASSISTANT

## 2024-08-07 RX ORDER — HYDROXYZINE HYDROCHLORIDE 10 MG/1
10-30 TABLET, FILM COATED ORAL 3 TIMES DAILY PRN
Qty: 60 TABLET | Refills: 1 | Status: SHIPPED | OUTPATIENT
Start: 2024-08-07

## 2024-08-07 ASSESSMENT — ANXIETY QUESTIONNAIRES
5. BEING SO RESTLESS THAT IT IS HARD TO SIT STILL: NOT AT ALL
GAD7 TOTAL SCORE: 12
GAD7 TOTAL SCORE: 12
4. TROUBLE RELAXING: NEARLY EVERY DAY
7. FEELING AFRAID AS IF SOMETHING AWFUL MIGHT HAPPEN: MORE THAN HALF THE DAYS
6. BECOMING EASILY ANNOYED OR IRRITABLE: SEVERAL DAYS
1. FEELING NERVOUS, ANXIOUS, OR ON EDGE: MORE THAN HALF THE DAYS
2. NOT BEING ABLE TO STOP OR CONTROL WORRYING: MORE THAN HALF THE DAYS
IF YOU CHECKED OFF ANY PROBLEMS ON THIS QUESTIONNAIRE, HOW DIFFICULT HAVE THESE PROBLEMS MADE IT FOR YOU TO DO YOUR WORK, TAKE CARE OF THINGS AT HOME, OR GET ALONG WITH OTHER PEOPLE: SOMEWHAT DIFFICULT
3. WORRYING TOO MUCH ABOUT DIFFERENT THINGS: MORE THAN HALF THE DAYS
8. IF YOU CHECKED OFF ANY PROBLEMS, HOW DIFFICULT HAVE THESE MADE IT FOR YOU TO DO YOUR WORK, TAKE CARE OF THINGS AT HOME, OR GET ALONG WITH OTHER PEOPLE?: SOMEWHAT DIFFICULT
7. FEELING AFRAID AS IF SOMETHING AWFUL MIGHT HAPPEN: MORE THAN HALF THE DAYS
GAD7 TOTAL SCORE: 12

## 2024-08-07 ASSESSMENT — PAIN SCALES - GENERAL: PAINLEVEL: MODERATE PAIN (4)

## 2024-08-07 NOTE — PROGRESS NOTES
"  Assessment & Plan       ICD-10-CM    1. Sinusitis, unspecified chronicity, unspecified location  J32.9 amoxicillin-clavulanate (AUGMENTIN) 875-125 MG tablet      2. Anxiety  F41.9 hydrOXYzine HCl (ATARAX) 10 MG tablet      3. Tension headache  G44.209       Warning signs discussed. Side effects discussed. Symptomatic treatment such as pushing fluids. Ok for over the counter acetaminophen and /or non-steroidal anti-inflammatory medication as needed.    2.  We talked about anxiety management at this point he wanted to try hydroxyzine as needed.  We talked about warning signs and side effects.  Recheck in a month as needed.  3.  Talk to him about tension headaches.  We talked about causes and treatment options.  We talked about conservative management with ice and heat along with Tylenol ibuprofen usage.  Warning signs were discussed.    Katelynn Dias is a 33 year old, presenting for the following health issues:  Anxiety    History of Present Illness       Mental Health Follow-up:  Patient presents to follow-up on Anxiety.    Patient's anxiety since last visit has been:  Worse  The patient is having other symptoms associated with anxiety.  Any significant life events: health concerns  Patient is feeling anxious or having panic attacks.  Patient has no concerns about alcohol or drug use.    Headaches:   Since the patient's last clinic visit, headaches are: improved  The patient is getting headaches:  Daily  He is able to do normal daily activities when he has a migraine.  The patient is taking the following rescue/relief medications:  Tylenol   Patient states \"I get some relief\" from the rescue/relief medications.   The patient is taking the following medications to prevent migraines:  No medications to prevent migraines  In the past 4 weeks, the patient has gone to an Urgent Care or Emergency Room 1 time times due to headaches.    He eats 0-1 servings of fruits and vegetables daily.He consumes 1 sweetened " "beverage(s) daily.He exercises with enough effort to increase his heart rate 30 to 60 minutes per day.  He exercises with enough effort to increase his heart rate 4 days per week.   He is taking medications regularly.     Couple months of increase anxiety. Had increase stress at work. Wasn't getting well and had increase caffeine and less water.   Gradual onset of bilateral temporal headache.   Now getting random headaches.   Recently more right sided facial sinus pressure. Worse with bending over.   Sometimes headache will start in back of head and upper trap region.   Daily marijauna smoker.     Was seen in ED. Care Everywhere Reviewed-   Head CT :  1.  No CT evidence for acute intracranial process.   2.  Mild inflammatory changes of the paranasal sinuses.       Was given anxiety medication and helped his symptoms.   He states he will get bouts of anxiety that lasts a few weeks once or twice a year.  Then they self resolved.  He does not feel like this is a daily problem.    Review of Systems  Constitutional, HEENT, cardiovascular, pulmonary, gi and gu systems are negative, except as otherwise noted.      Objective    /89   Pulse 76   Temp 97.8  F (36.6  C) (Tympanic)   Resp 16   Ht 1.727 m (5' 8\")   Wt 76.7 kg (169 lb)   SpO2 98%   BMI 25.70 kg/m    Body mass index is 25.7 kg/m .  Physical Exam   GENERAL: alert and no distress  EYES: Eyes grossly normal to inspection, PERRL and conjunctivae and sclerae normal  HENT: ear canals and TM's normal, nose and mouth without ulcers or lesions  NECK: no adenopathy, no asymmetry, masses, or scars  RESP: lungs clear to auscultation - no rales, rhonchi or wheezes  CV: regular rate and rhythm, normal S1 S2, no S3 or S4, no murmur, click or rub, no peripheral edema  ABDOMEN: soft, nontender, no hepatosplenomegaly, no masses and bowel sounds normal  MS: no gross musculoskeletal defects noted, no edema  SKIN: no suspicious lesions or rashes  NEURO: Normal strength " and tone, mentation intact and speech normal  PSYCH: mentation appears normal, affect normal/bright            Signed Electronically by: Lew Posey PA-C

## 2024-10-31 ENCOUNTER — OFFICE VISIT (OUTPATIENT)
Dept: FAMILY MEDICINE | Facility: CLINIC | Age: 34
End: 2024-10-31
Payer: COMMERCIAL

## 2024-10-31 VITALS
SYSTOLIC BLOOD PRESSURE: 120 MMHG | OXYGEN SATURATION: 98 % | WEIGHT: 181 LBS | HEART RATE: 64 BPM | RESPIRATION RATE: 15 BRPM | TEMPERATURE: 98.1 F | BODY MASS INDEX: 27.43 KG/M2 | DIASTOLIC BLOOD PRESSURE: 82 MMHG | HEIGHT: 68 IN

## 2024-10-31 DIAGNOSIS — D22.39 FIBROUS PAPULE OF NOSE: ICD-10-CM

## 2024-10-31 DIAGNOSIS — J34.89 IRRITATION OF NOSE: Primary | ICD-10-CM

## 2024-10-31 PROCEDURE — 99213 OFFICE O/P EST LOW 20 MIN: CPT | Performed by: PHYSICIAN ASSISTANT

## 2024-10-31 ASSESSMENT — PAIN SCALES - GENERAL: PAINLEVEL_OUTOF10: NO PAIN (0)

## 2024-10-31 NOTE — PROGRESS NOTES
"  Assessment & Plan     (J34.89) Irritation of nose  (primary encounter diagnosis)  Comment: Irritation along the right nostril kiesselbach's plexus  appears consistent with dry nasal tissue.  Discussed moisturizing regimen.  Using humidifier overnight.  Discussed signs and symptoms to watch for for epistaxis.  Plan: Adult ENT  Referral          (D22.39) Fibrous papule of nose  Comment: Apparent fibrous papule of the nose.  Patient has noticed this over the last few days.  There is no overlying erythema or skin changes.  No evidence of abscess.  Discussed potential ENT referral for further management.  Patient is very anxious about this episode.  Plan: Adult ENT  Referral               Katelynn Dias is a 33 year old, presenting for the following health issues:  Mass (Nose/)        10/31/2024    11:19 AM   Additional Questions   Roomed by milton   Accompanied by self     History of Present Illness       Reason for visit:  Bump on nose and inside  Symptom onset:  1-3 days ago   He is taking medications regularly.     Over the last few days patient has noticed a bump over the right aspect of his nasal alar crease.  There is no pain associated with this.  During his self examination m noted irritation along the septum of the right nostril.  No trauma or injury.  Not having any nosebleeds.      Review of Systems  Constitutional, HEENT, cardiovascular, pulmonary, gi and gu systems are negative, except as otherwise noted.      Objective    /82   Pulse 64   Temp 98.1  F (36.7  C) (Oral)   Resp 15   Ht 1.727 m (5' 8\")   Wt 82.1 kg (181 lb)   SpO2 98%   BMI 27.52 kg/m    Body mass index is 27.52 kg/m .  Physical Exam   GENERAL: alert and no distress  EYES: Eyes grossly normal to inspection, PERRL and conjunctivae and sclerae normal  HENT: normal cephalic/atraumatic, oropharynx clear, oral mucous membranes moist just superior to the right nasal alar crease there is faint nodule without " overlying erythema or warmth.  No fluctuance.  Within the right nostril along Kiesselbach's plexus slight irritation without any active bleeding.  NECK: no adenopathy, no asymmetry, masses, or scars  RESP: lungs clear to auscultation - no rales, rhonchi or wheezes  CV: regular rate and rhythm, normal S1 S2, no S3 or S4, no murmur, click or rub, no peripheral edema  MS: no gross musculoskeletal defects noted, no edema            Signed Electronically by: Chintan Samuel PA-C

## 2024-11-06 ENCOUNTER — OFFICE VISIT (OUTPATIENT)
Dept: FAMILY MEDICINE | Facility: CLINIC | Age: 34
End: 2024-11-06
Payer: COMMERCIAL

## 2024-11-06 VITALS
TEMPERATURE: 96.6 F | BODY MASS INDEX: 28.04 KG/M2 | WEIGHT: 185 LBS | DIASTOLIC BLOOD PRESSURE: 84 MMHG | SYSTOLIC BLOOD PRESSURE: 134 MMHG | RESPIRATION RATE: 16 BRPM | HEIGHT: 68 IN | OXYGEN SATURATION: 99 % | HEART RATE: 80 BPM

## 2024-11-06 DIAGNOSIS — Z30.09 VASECTOMY EVALUATION: ICD-10-CM

## 2024-11-06 DIAGNOSIS — J34.89 NOSE IRRITATION: ICD-10-CM

## 2024-11-06 DIAGNOSIS — L91.8 SKIN TAG: ICD-10-CM

## 2024-11-06 DIAGNOSIS — Z00.00 ROUTINE GENERAL MEDICAL EXAMINATION AT A HEALTH CARE FACILITY: Primary | ICD-10-CM

## 2024-11-06 LAB
ALBUMIN SERPL BCG-MCNC: 4.4 G/DL (ref 3.5–5.2)
ALP SERPL-CCNC: 44 U/L (ref 40–150)
ALT SERPL W P-5'-P-CCNC: 13 U/L (ref 0–70)
ANION GAP SERPL CALCULATED.3IONS-SCNC: 11 MMOL/L (ref 7–15)
AST SERPL W P-5'-P-CCNC: 21 U/L (ref 0–45)
BASOPHILS # BLD AUTO: 0.1 10E3/UL (ref 0–0.2)
BASOPHILS NFR BLD AUTO: 1 %
BILIRUB SERPL-MCNC: 0.6 MG/DL
BUN SERPL-MCNC: 18.3 MG/DL (ref 6–20)
CALCIUM SERPL-MCNC: 9.1 MG/DL (ref 8.8–10.4)
CHLORIDE SERPL-SCNC: 103 MMOL/L (ref 98–107)
CHOLEST SERPL-MCNC: 182 MG/DL
CREAT SERPL-MCNC: 1.04 MG/DL (ref 0.67–1.17)
EGFRCR SERPLBLD CKD-EPI 2021: >90 ML/MIN/1.73M2
EOSINOPHIL # BLD AUTO: 0.3 10E3/UL (ref 0–0.7)
EOSINOPHIL NFR BLD AUTO: 5 %
ERYTHROCYTE [DISTWIDTH] IN BLOOD BY AUTOMATED COUNT: 11.9 % (ref 10–15)
FASTING STATUS PATIENT QL REPORTED: YES
FASTING STATUS PATIENT QL REPORTED: YES
GLUCOSE SERPL-MCNC: 110 MG/DL (ref 70–99)
HCO3 SERPL-SCNC: 24 MMOL/L (ref 22–29)
HCT VFR BLD AUTO: 44.8 % (ref 40–53)
HCV AB SERPL QL IA: NONREACTIVE
HDLC SERPL-MCNC: 73 MG/DL
HGB BLD-MCNC: 15.3 G/DL (ref 13.3–17.7)
HIV 1+2 AB+HIV1 P24 AG SERPL QL IA: NONREACTIVE
IMM GRANULOCYTES # BLD: 0 10E3/UL
IMM GRANULOCYTES NFR BLD: 0 %
LDLC SERPL CALC-MCNC: 96 MG/DL
LYMPHOCYTES # BLD AUTO: 2.1 10E3/UL (ref 0.8–5.3)
LYMPHOCYTES NFR BLD AUTO: 32 %
MCH RBC QN AUTO: 33 PG (ref 26.5–33)
MCHC RBC AUTO-ENTMCNC: 34.2 G/DL (ref 31.5–36.5)
MCV RBC AUTO: 97 FL (ref 78–100)
MONOCYTES # BLD AUTO: 0.5 10E3/UL (ref 0–1.3)
MONOCYTES NFR BLD AUTO: 8 %
NEUTROPHILS # BLD AUTO: 3.5 10E3/UL (ref 1.6–8.3)
NEUTROPHILS NFR BLD AUTO: 54 %
NONHDLC SERPL-MCNC: 109 MG/DL
PLATELET # BLD AUTO: 225 10E3/UL (ref 150–450)
POTASSIUM SERPL-SCNC: 4.4 MMOL/L (ref 3.4–5.3)
PROT SERPL-MCNC: 6.8 G/DL (ref 6.4–8.3)
RBC # BLD AUTO: 4.63 10E6/UL (ref 4.4–5.9)
SODIUM SERPL-SCNC: 138 MMOL/L (ref 135–145)
TRIGL SERPL-MCNC: 64 MG/DL
TSH SERPL DL<=0.005 MIU/L-ACNC: 2.32 UIU/ML (ref 0.3–4.2)
WBC # BLD AUTO: 6.4 10E3/UL (ref 4–11)

## 2024-11-06 PROCEDURE — 99213 OFFICE O/P EST LOW 20 MIN: CPT | Mod: 25 | Performed by: PHYSICIAN ASSISTANT

## 2024-11-06 PROCEDURE — 11200 RMVL SKIN TAGS UP TO&INC 15: CPT | Performed by: PHYSICIAN ASSISTANT

## 2024-11-06 PROCEDURE — 87389 HIV-1 AG W/HIV-1&-2 AB AG IA: CPT | Performed by: PHYSICIAN ASSISTANT

## 2024-11-06 PROCEDURE — 99395 PREV VISIT EST AGE 18-39: CPT | Mod: 25 | Performed by: PHYSICIAN ASSISTANT

## 2024-11-06 PROCEDURE — 36415 COLL VENOUS BLD VENIPUNCTURE: CPT | Performed by: PHYSICIAN ASSISTANT

## 2024-11-06 PROCEDURE — 80053 COMPREHEN METABOLIC PANEL: CPT | Performed by: PHYSICIAN ASSISTANT

## 2024-11-06 PROCEDURE — 85025 COMPLETE CBC W/AUTO DIFF WBC: CPT | Performed by: PHYSICIAN ASSISTANT

## 2024-11-06 PROCEDURE — 80061 LIPID PANEL: CPT | Performed by: PHYSICIAN ASSISTANT

## 2024-11-06 PROCEDURE — 86803 HEPATITIS C AB TEST: CPT | Performed by: PHYSICIAN ASSISTANT

## 2024-11-06 PROCEDURE — 84443 ASSAY THYROID STIM HORMONE: CPT | Performed by: PHYSICIAN ASSISTANT

## 2024-11-06 SDOH — HEALTH STABILITY: PHYSICAL HEALTH: ON AVERAGE, HOW MANY DAYS PER WEEK DO YOU ENGAGE IN MODERATE TO STRENUOUS EXERCISE (LIKE A BRISK WALK)?: 0 DAYS

## 2024-11-06 SDOH — HEALTH STABILITY: PHYSICAL HEALTH: ON AVERAGE, HOW MANY MINUTES DO YOU ENGAGE IN EXERCISE AT THIS LEVEL?: 0 MIN

## 2024-11-06 ASSESSMENT — PAIN SCALES - GENERAL: PAINLEVEL_OUTOF10: NO PAIN (0)

## 2024-11-06 ASSESSMENT — SOCIAL DETERMINANTS OF HEALTH (SDOH): HOW OFTEN DO YOU GET TOGETHER WITH FRIENDS OR RELATIVES?: TWICE A WEEK

## 2024-11-06 NOTE — PATIENT INSTRUCTIONS
Patient Education   Preventive Care Advice   This is general advice given by our system to help you stay healthy. However, your care team may have specific advice just for you. Please talk to your care team about your preventive care needs.  Nutrition  Eat 5 or more servings of fruits and vegetables each day.  Try wheat bread, brown rice and whole grain pasta (instead of white bread, rice, and pasta).  Get enough calcium and vitamin D. Check the label on foods and aim for 100% of the RDA (recommended daily allowance).  Lifestyle  Exercise at least 150 minutes each week  (30 minutes a day, 5 days a week).  Do muscle strengthening activities 2 days a week. These help control your weight and prevent disease.  No smoking.  Wear sunscreen to prevent skin cancer.  Have a dental exam and cleaning every 6 months.  Yearly exams  See your health care team every year to talk about:  Any changes in your health.  Any medicines your care team has prescribed.  Preventive care, family planning, and ways to prevent chronic diseases.  Shots (vaccines)   HPV shots (up to age 26), if you've never had them before.  Hepatitis B shots (up to age 59), if you've never had them before.  COVID-19 shot: Get this shot when it's due.  Flu shot: Get a flu shot every year.  Tetanus shot: Get a tetanus shot every 10 years.  Pneumococcal, hepatitis A, and RSV shots: Ask your care team if you need these based on your risk.  Shingles shot (for age 50 and up)  General health tests  Diabetes screening:  Starting at age 35, Get screened for diabetes at least every 3 years.  If you are younger than age 35, ask your care team if you should be screened for diabetes.  Cholesterol test: At age 39, start having a cholesterol test every 5 years, or more often if advised.  Bone density scan (DEXA): At age 50, ask your care team if you should have this scan for osteoporosis (brittle bones).  Hepatitis C: Get tested at least once in your life.  STIs (sexually  transmitted infections)  Before age 24: Ask your care team if you should be screened for STIs.  After age 24: Get screened for STIs if you're at risk. You are at risk for STIs (including HIV) if:  You are sexually active with more than one person.  You don't use condoms every time.  You or a partner was diagnosed with a sexually transmitted infection.  If you are at risk for HIV, ask about PrEP medicine to prevent HIV.  Get tested for HIV at least once in your life, whether you are at risk for HIV or not.  Cancer screening tests  Cervical cancer screening: If you have a cervix, begin getting regular cervical cancer screening tests starting at age 21.  Breast cancer scan (mammogram): If you've ever had breasts, begin having regular mammograms starting at age 40. This is a scan to check for breast cancer.  Colon cancer screening: It is important to start screening for colon cancer at age 45.  Have a colonoscopy test every 10 years (or more often if you're at risk) Or, ask your provider about stool tests like a FIT test every year or Cologuard test every 3 years.  To learn more about your testing options, visit:   .  For help making a decision, visit:   https://bit.ly/aj23475.  Prostate cancer screening test: If you have a prostate, ask your care team if a prostate cancer screening test (PSA) at age 55 is right for you.  Lung cancer screening: If you are a current or former smoker ages 50 to 80, ask your care team if ongoing lung cancer screenings are right for you.  For informational purposes only. Not to replace the advice of your health care provider. Copyright   2023 [employer] All rights reserved. Clinically reviewed by the [facility] Transitions Program. [employer] [id number] - REV 01/24.

## 2024-11-06 NOTE — PROGRESS NOTES
"  Assessment & Plan       ICD-10-CM    1. Routine general medical examination at a health care facility  Z00.00 HIV Antigen Antibody Combo     Hepatitis C Screen Reflex to HCV RNA Quant and Genotype     PRIMARY CARE FOLLOW-UP SCHEDULING     Lipid panel reflex to direct LDL Fasting     Comprehensive metabolic panel (BMP + Alb, Alk Phos, ALT, AST, Total. Bili, TP)     TSH with free T4 reflex     CBC with platelets and differential     HIV Antigen Antibody Combo     Hepatitis C Screen Reflex to HCV RNA Quant and Genotype     Lipid panel reflex to direct LDL Fasting     Comprehensive metabolic panel (BMP + Alb, Alk Phos, ALT, AST, Total. Bili, TP)     TSH with free T4 reflex     CBC with platelets and differential      2. Skin tag  L91.8 REMOVAL OF SKIN TAGS, FIRST 15      3. Nose irritation  J34.89 OFFICE/OUTPT VISIT,EST,LEVL III      4. Vasectomy evaluation  Z30.09 OFFICE/OUTPT VISIT,EST,LEVL III        Work on Healthy diet and exercise. Getting heart rate elevated for 30 mins most days of week.  Labs pending  2.  Skin was cleaned with Betadine the base of the skin tag was injected with 1% with lidocaine 1 cc.  15 blade was used to excise the skin tag.  Silver nitrate sticks were used to stop the bleeding.  Warning signs discussed.  Watch for signs of infection.  Follow-up 1 week as needed.  3. Keep follow up  with ENT.   4. See below     BMI  Estimated body mass index is 28.13 kg/m  as calculated from the following:    Height as of this encounter: 1.727 m (5' 8\").    Weight as of this encounter: 83.9 kg (185 lb).   Weight management plan: Discussed healthy diet and exercise guidelines    Counseling  Appropriate preventive services were addressed with this patient via screening, questionnaire, or discussion as appropriate for fall prevention, nutrition, physical activity, Tobacco-use cessation, social engagement, weight loss and cognition.  Checklist reviewing preventive services available has been given to the " "patient.  Reviewed patient's diet, addressing concerns and/or questions.   The patient was instructed to see the dentist every 6 months.         Katelynn Dias is a 34 year old, presenting for the following health issues:  Physical        11/6/2024     8:23 AM   Additional Questions   Roomed by alexis   Accompanied by self         11/6/2024     8:23 AM   Patient Reported Additional Medications   Patient reports taking the following new medications no     History of Present Illness       Reason for visit:  Bump on nose and inside  Symptom onset:  1-3 days ago   He is taking medications regularly.  Noticed a lump on left side of nose about 1 wk ago. No pain.   Off and on nasal congestion.     Struggles with anxiety off and on. Uses hydroxyzine as needed with little help.   Struggles with racy mind. States he will be seeing a psychiatrist 11/25.     Also interested in a vasectomy     Review of Systems  Constitutional, HEENT, cardiovascular, pulmonary, gi and gu systems are negative, except as otherwise noted.      Objective    /84   Pulse 80   Temp (!) 96.6  F (35.9  C) (Tympanic)   Resp 16   Ht 1.727 m (5' 8\")   Wt 83.9 kg (185 lb)   SpO2 99%   BMI 28.13 kg/m    Body mass index is 28.13 kg/m .  Physical Exam   GENERAL: alert and no distress  EYES: Eyes grossly normal to inspection, PERRL and conjunctivae and sclerae normal  HENT: ear canals and TM's normal, nose and mouth without ulcers or lesions- possible more prominent septum on his right nare.  It is not tender.  No signs of infection  NECK: no adenopathy, no asymmetry, masses, or scars  RESP: lungs clear to auscultation - no rales, rhonchi or wheezes  CV: regular rate and rhythm, normal S1 S2, no S3 or S4, no murmur, click or rub, no peripheral edema  ABDOMEN: soft, nontender, no hepatosplenomegaly, no masses and bowel sounds normal   (male): normal male genitalia without lesions or urethral discharge, no hernia  MS: no gross musculoskeletal " "defects noted, no edema  SKIN: no suspicious lesions or rashes  SKIN: 1 cm skin tag suprapubic region  NEURO: Normal strength and tone, mentation intact and speech normal  PSYCH: mentation appears normal, affect normal/bright    Labs pending        Signed Electronically by: Lew Posey PA-C        SUBJECTIVE:  The patient presents for discussion of vasectomy.  The procedure was explained in detail using diagram from the vasectomy pamphlet published by [employer] [facility].  The permanency and effactiveness of this operation were explained in detail, including casectomy failure rate, bleeding, infection, scarring, chronic pain and epididymititis.  The patient was told to stay at bedrest for 48-72 hours, no heavy lifting for one week and to refrain from sex for 1 week.  The patient was also told to have a post operative sperm count at 3 months.  He should have another birth control until he has a sample that is infertile.      We next discussed the risks of vasectomy. The risks discussed included:    -Bleeding at the time of the procedure or later including hematoma development.  -Infection  -Postoperative discomfort  -Development of a sperm granuloma which is a lump that can form at the site of the transection of the vas deferens.  -Sperm buildup in the testicles that may cause a sensation of congestion or discomfort. which is usually responsive to a non steroidal anti-inflammatory drug.  -Epididymitis can also occur and can cause scrotal discomfort.  -Reconnection of the vas deferans which can occur in up to 1 in 2000 cases per the literature.  -Development of sperm antibodies which may leave a man infertile despite having a reversal of the vasectomy later.  - Long term testicular discomfort which is very rare.        OBJECTIVE:  On exam, this is a well-developed, well-nourished white male.  Weight is 185 lbs 0 oz.  Height is   Ht Readings from Last 2 Encounters:   11/06/24 1.727 m (5' 8\")   10/31/24 " "1.727 m (5' 8\")       Exam reveals a normal circumcised penis, no lesions.  Testes are descended bilaterally. Both vas deferens were easily identified by palpation.  No abnormalities were noted. Exam was limited to the genitalia    ASSESSMENT:  Vasectomy evaluation    PLAN:  He will schedule a vasectomy at his convenience. He was asked to wear warm layers of clothes on top and to wear warm socks.  He is aware that he will need to take several days off from work and any physical activity and essentially rest for two days with ice packs and ibuprofen for discomfort. He was also informed that he will need to bring a semen sample in 3 months to make sure that he has cleared the urinary tract  of any residual sperm and to make sure that he is sterile. He was informed that he should continue to use contraception until we have proven that he is sterile.    Lew Posey PA-C    "

## 2024-11-07 NOTE — RESULT ENCOUNTER NOTE
Mr. Valdez,    All of your labs were normal/near normal for you.    Please contact the clinic if you have additional questions.  Thank you.    Sincerely,    Lew Posey PA-C      Detail Level: Detailed

## 2024-11-11 ENCOUNTER — MYC MEDICAL ADVICE (OUTPATIENT)
Dept: FAMILY MEDICINE | Facility: CLINIC | Age: 34
End: 2024-11-11
Payer: COMMERCIAL

## 2024-11-27 NOTE — TELEPHONE ENCOUNTER
FUTURE VISIT INFORMATION      FUTURE VISIT INFORMATION:  Date:1/21/25  Time: 8:40AM  [place] [facility] [place]AL INFORMATION:  Referring provider:  Chintan Samuel PA-C  Referring providers clinic:  [facility]  Reason for visit/diagnosis  DX J34.89 (ICD-10-CM) - Irritation of nose  D22.39 (ICD-10-CM) - Fibrous papule of nose  REF'D by Chintan Samuel PA-C  SCHEDULED W/ PT  [facility] verified     RECORDS REQUESTED FROM:       Clinic name Comments Records Status Imaging Status   [facility] 10/31/24- ov wChintan Caraballo PA-C  8/7/24- Ov Lew Kemp PA-C  Epic     [facility]  11/25/24- OV wDemi Jeffries PA  11/13/24- Ov Mary Ch PA      11/5/24- Ed Junior Monson PA    7/28/24- ED Nav[facility] [place],     12/1/21- Ov Luanne Correa PA    11/1/21- Ov Shira Brown, NP   CE    [facility]  7/28/24- CT Head Brain  CE  11/27/24  Pending            November 27, 2024 11:40 AM - Faxed a request to [facility] to push Image to [place] PACS- [place]

## 2024-12-04 ENCOUNTER — OFFICE VISIT (OUTPATIENT)
Dept: FAMILY MEDICINE | Facility: CLINIC | Age: 34
End: 2024-12-04
Payer: COMMERCIAL

## 2024-12-04 VITALS
DIASTOLIC BLOOD PRESSURE: 78 MMHG | BODY MASS INDEX: 27.43 KG/M2 | TEMPERATURE: 97.5 F | WEIGHT: 181 LBS | SYSTOLIC BLOOD PRESSURE: 126 MMHG | HEART RATE: 84 BPM | HEIGHT: 68 IN | OXYGEN SATURATION: 99 % | RESPIRATION RATE: 18 BRPM

## 2024-12-04 DIAGNOSIS — R09.81 SINUS CONGESTION: Primary | ICD-10-CM

## 2024-12-04 DIAGNOSIS — J34.89 NOSE IRRITATION: ICD-10-CM

## 2024-12-04 PROCEDURE — G2211 COMPLEX E/M VISIT ADD ON: HCPCS | Performed by: PHYSICIAN ASSISTANT

## 2024-12-04 PROCEDURE — 99213 OFFICE O/P EST LOW 20 MIN: CPT | Performed by: PHYSICIAN ASSISTANT

## 2024-12-04 ASSESSMENT — PAIN SCALES - GENERAL: PAINLEVEL_OUTOF10: NO PAIN (0)

## 2024-12-04 NOTE — PROGRESS NOTES
Assessment & Plan       ICD-10-CM    1. Sinus congestion  R09.81       2. Nose irritation  J34.89       Talk to patient about his concerns I do not appreciate much swelling that he describes of his cheek.  There is no erythema or noted so I have low suspicion for any type of infection.  We talked about treatment for nasal congestion which would consist of nasal rinse and/or Flonase and or antihistamines.  Will treat this conservatively for now.  Warning signs were discussed.  He does have a follow-up in [facility] with ENT for second opinion.      The longitudinal plan of care for the diagnosis(es)/condition(s) as documented were addressed during this visit. Due to the added complexity in care, I will continue to support Arun in the subsequent management and with ongoing continuity of care.  Katelynn Dias is a 34 year old, presenting for the following health issues:  Derm Problem    History of Present Illness       Reason for visit:  Patch of skin on face and raised    He eats 0-1 servings of fruits and vegetables daily.He consumes 1 sweetened beverage(s) daily.He exercises with enough effort to increase his heart rate 30 to 60 minutes per day.  He exercises with enough effort to increase his heart rate 4 days per week.   He is taking medications regularly.  Patient is here for continued nasal congestion and pressure more in his right cheek.  He was treated with an antibiotic which helped his symptoms but they have come back up.  He denies any fevers chills or bodyaches.  He states his right cheek seems to be swollen to him.  He has seen dermatology x 2 for similar symptoms and they have not found any cause.  He does state he has a chip more in his back tooth but does not have any pain or fever symptoms but is not sure if that is contributing factor.      Review of Systems  Constitutional, HEENT, cardiovascular, pulmonary, gi and gu systems are negative, except as otherwise noted.      Objective    /78    "Pulse 84   Temp 97.5  F (36.4  C) (Tympanic)   Resp 18   Ht 1.727 m (5' 8\")   Wt 82.1 kg (181 lb)   SpO2 99%   BMI 27.52 kg/m    Body mass index is 27.52 kg/m .  Physical Exam   GENERAL: alert and no distress  EYES: Eyes grossly normal to inspection, PERRL and conjunctivae and sclerae normal  HENT: ear canals and TM's normal, nose and mouth without ulcers or lesions  NECK: no adenopathy, no asymmetry, masses, or scars  RESP: lungs clear to auscultation - no rales, rhonchi or wheezes  CV: regular rate and rhythm, normal S1 S2, no S3 or S4, no murmur, click or rub, no peripheral edema  PSYCH: mentation appears normal, affect normal/bright            Signed Electronically by: Lew Posey PA-C    "

## 2024-12-19 ENCOUNTER — OFFICE VISIT (OUTPATIENT)
Dept: OTOLARYNGOLOGY | Facility: CLINIC | Age: 34
End: 2024-12-19
Attending: PHYSICIAN ASSISTANT
Payer: COMMERCIAL

## 2024-12-19 VITALS
HEIGHT: 68 IN | HEART RATE: 75 BPM | WEIGHT: 185.2 LBS | BODY MASS INDEX: 28.07 KG/M2 | SYSTOLIC BLOOD PRESSURE: 140 MMHG | OXYGEN SATURATION: 98 % | DIASTOLIC BLOOD PRESSURE: 91 MMHG

## 2024-12-19 DIAGNOSIS — J34.89 IRRITATION OF NOSE: ICD-10-CM

## 2024-12-19 DIAGNOSIS — D22.39 FIBROUS PAPULE OF NOSE: ICD-10-CM

## 2024-12-19 DIAGNOSIS — J32.9 CHRONIC SINUSITIS, UNSPECIFIED LOCATION: Primary | ICD-10-CM

## 2024-12-19 RX ORDER — MUPIROCIN 20 MG/G
OINTMENT TOPICAL
Qty: 15 G | Refills: 0 | Status: SHIPPED | OUTPATIENT
Start: 2024-12-19

## 2024-12-19 RX ORDER — PREDNISONE 10 MG/1
TABLET ORAL
Qty: 20 TABLET | Refills: 0 | Status: SHIPPED | OUTPATIENT
Start: 2024-12-19 | End: 2024-12-29

## 2024-12-19 ASSESSMENT — PAIN SCALES - GENERAL: PAINLEVEL_OUTOF10: NO PAIN (0)

## 2024-12-19 NOTE — PROGRESS NOTES
[facility]  New Patient Visit      Encounter date:   December 19, 2024    Referring Provider:   Chintan Samuel PA-C    Chief Complaint: New Patient    History of Present Illness:   Jf Valdez is a 34 year old male who presents for consultation regarding nasal irritation and fibrous papule.    He was initially seen by KEVIN Samuel on 10/31/24:  Over the last few days patient has noticed a bump over the right aspect of his nasal alar crease.  There is no pain associated with this.  During his self examination m noted irritation along the septum of the right nostril.  No trauma or injury.  Not having any nosebleeds.     Irritation of nose  (primary encounter diagnosis)  Comment: Irritation along the right nostril kiesselbach's plexus  appears consistent with dry nasal tissue.  Discussed moisturizing regimen.  Using humidifier overnight.  Discussed signs and symptoms to watch for for epistaxis.  Plan: Adult ENT  Referral          Fibrous papule of nose  Comment: Apparent fibrous papule of the nose.  Patient has noticed this over the last few days.  There is no overlying erythema or skin changes.  No evidence of abscess.  Discussed potential ENT referral for further management.  Patient is very anxious about this episode.  Plan: Adult ENT  Referral      Today, he reports that he started having headaches in 7/2024, and was seen in the ED on 7/28/24. Then on 8/7/24, he was seen by FP and diagnosed with sinusitis as prescribed Augmentin BID x 10 days along with hydroxyzine HCl for anxiety, and also diagnosed with a tension headache.    He was then seen by [facility] dermatology on 11/13/24 and 11/25/24, and both times they noted a normal exam of the nose with no identifiable external masses. On 12/4/24, he was seen by FP for another cold, which he believed he got from his kids, who were sick at the time. He was not prescribed antibiotics, as provider noted low suspicion for an  active sinus infection.     Additionally, he reports some intermittent dryness, swelling, and ear pressure. He never really had nose issues prior to 7/2024. He notices lately when he blows his nose that there will be blood on the tissue, which will persist for 2-3 days during which he will put tissue in his nose to stop the bleeding and then it eventually stops, then it recurs. He has not tried nasal sprays, but did purchase a NeilMed neti pot, but has not used it yet. Endorses a history of possible seasonal allergies. Reports his heart was racing the one time he took prednisone in the past.    Sino-Nasal Outcome Test (SNOT - 22)  1. Need to Blow Nose: (Patient-Rptd) (P) Moderate  2. Nasal Blockage: (Patient-Rptd) (P) Moderate  3. Sneezing: (Patient-Rptd) (P) Very mild  4. Runny Nose: (Patient-Rptd) (P) Very mild  5. Cough: (Patient-Rptd) (P) None  6. Post-nasal discharge: (Patient-Rptd) (P) Mild or slight  7. Thick nasal discharge: (Patient-Rptd) (P) Moderate  8. Ear fullness: (Patient-Rptd) (P) Very mild  9. Dizziness: (Patient-Rptd) (P) None  10. Ear Pain: (Patient-Rptd) (P) None  11. Facial pain/pressure: (Patient-Rptd) (P) Moderate  12. Decreased Sense of Smell/Taste: (Patient-Rptd) (P) Mild or slight  13. Difficulty falling asleep: (Patient-Rptd) (P) None  14. Wake up at night: (Patient-Rptd) (P) Very mild  15. Lack of a good night's sleep: (Patient-Rptd) (P) Moderate  16. Wake up tired: (Patient-Rptd) (P) Mild or slight  17. Fatigue: (Patient-Rptd) (P) Very mild  18. Reduced Productivity: (Patient-Rptd) (P) Mild or slight  19. Reduced Concentration: (Patient-Rptd) (P) None  20. Frustrated/restless/irritable: (Patient-Rptd) (P) Severe  21. Sad: (Patient-Rptd) (P) Moderate  22. Embarrassed: (Patient-Rptd) (P) None  Total Score: (Patient-Rptd) (P) 35    [place] [facility]:  1990 myringotomy with tube placement      Review of systems: A 14-point review of systems has been conducted and was negative for  any notable symptoms, except as dictated in the history of present illness.     Medical History:  No past medical history on file.     Surgical History:   No past surgical history on file.     Family History:  Family History   Problem Relation Age of Onset    Hypertension Mother     Colon Cancer Paternal Grandfather 70    Colon Cancer Paternal Uncle 55        Social History:   Social History     Socioeconomic History    Marital status:    Tobacco Use    Smoking status: Never    Smokeless tobacco: Never   Vaping Use    Vaping status: Never Used   Substance and Sexual Activity    Alcohol use: Yes     Alcohol/week: 2.0 standard drinks of alcohol     Types: 2 Shots of liquor per week    Drug use: Never    Sexual activity: Yes     Social Drivers of Health     Financial Resource Strain: Unknown (11/6/2024)    Financial Resource Strain     Within the past 12 months, have you or your family members you live with been unable to get utilities (heat, electricity) when it was really needed?: Patient declined   Food Insecurity: Unknown (11/6/2024)    Food Insecurity     Within the past 12 months, did you worry that your food would run out before you got money to buy more?: Patient declined     Within the past 12 months, did the food you bought just not last and you didn t have money to get more?: Patient declined   Transportation Needs: Unknown (11/6/2024)    Transportation Needs     Within the past 12 months, has lack of transportation kept you from medical appointments, getting your medicines, non-medical meetings or appointments, work, or from getting things that you need?: Patient declined   Physical Activity: Inactive (11/6/2024)    Exercise Vital Sign     Days of Exercise per Week: 0 days     Minutes of Exercise per Session: 0 min   Stress: Stress Concern Present (11/6/2024)    [employer] - Occupational Stress Questionnaire     Feeling of Stress : To some extent   Social Connections:  "Unknown (11/6/2024)    Social Connection and Isolation Panel [NHANES]     Frequency of Social Gatherings with Friends and Family: Twice a week   Interpersonal Safety: Low Risk  (7/11/2024)    Interpersonal Safety     Do you feel physically and emotionally safe where you currently live?: Yes     Within the past 12 months, have you been hit, slapped, kicked or otherwise physically hurt by someone?: No     Within the past 12 months, have you been humiliated or emotionally abused in other ways by your partner or ex-partner?: No   Housing Stability: Unknown (11/6/2024)    Housing Stability     Do you have housing? : Patient declined     Are you worried about losing your housing?: Patient declined        Physical Exam:  Vital signs: BP (!) 140/91 (BP Location: Left arm, Patient Position: Sitting, Cuff Size: Adult Large)   Pulse 75   Ht 1.727 m (5' 8\")   Wt 84 kg (185 lb 3.2 oz)   SpO2 98%   BMI 28.16 kg/m     General Appearance: No acute distress, appropriate demeanor, conversant  Eyes: moist conjunctivae; EOMI; pupils symmetric; visual acuity grossly intact; no proptosis  Head: normocephalic; overall symmetric appearance without deformity  Face: overall symmetric without deformity  Ears: Normal appearance of external ear; external meatus normal in appearance  Nose: No external deformity  Oral Cavity/oropharynx: Normal appearance of mucosa  Neck: no palpable lymphadenopathy; thyroid without palpable nodules  Lungs: symmetric chest rise; no wheezing  CV: Good distal perfusion; normal hear rate  Extremities: No deformity  Neurologic Exam: Cranial nerves II-XII are grossly intact; no focal deficit    Procedure Note  Procedure performed: Rigid nasal endoscopy  Indication: To evaluate for sinonasal pathology not visualized on routine anterior rhinoscopy  Anesthesia: 4% topical lidocaine with 0.05% oxymetazoline  Description of procedure: A 30 degree, 3 mm rigid endoscope was inserted into bilateral nasal cavities and the " nasal valves, nasal cavity, middle meatus, sphenoethmoid recess, and nasopharynx were thoroughly evaluated for evidence of obstruction, edema, purulence, polyps and/or mass/lesion.     [place]f Endoscopic Scoring System  Endoscopic observation Right Left   Polyps in middle meatus (0 = absent, 1 = restricted to middle meatus, 2 = Beyond middle meatus) 0 0   Discharge (0 = absent, 1 = thin and clear, 2 = thick, purulent) 1 1   Edema (0 = absent, 1 = mild-moderate, 2 = moderate-severe) 1 1   Crusting (0 = absent, 1 = mild-moderate, 2 = moderate-severe) 0 0   Scarring (0= absent, 1 = mild-moderate, 2 = moderate-severe) 0 0   Total 2 2     Findings  RT: Exam limited by septal deviation, but able to visualized nasopharynx and middle meatus without evidence of active infection.  LT: Thick drainage from the middle meatus that appears non-purulent. No evidence of mass or polyps.    The patient tolerated the procedure well without complication.     Imaging Review:  7/28/24 CT Head without Contrast ([facility])  INDICATION: Headache, chronic, new features or increased frequency   COMPARISON: None.     IMPRESSION:  1.  No CT evidence for acute intracranial process.   2.  Mild inflammatory changes of the paranasal sinuses.     Assessment/Medical Decision Making:  Jf Valdez is a 34 year old male whose symptoms are suggestive of a recent sinus infection with residual nasal swelling, which could lead to increase sinus and ear pain and pressure. On exam today, there are no concerning pathologies, and the nasal septal deviation to the right does not account for his symptoms. His nasal passages are open, and sinus cavities look good. His nasal passages were dry, which can usually exacerbate nasal symptoms, especially in the winter.     We had a detailed discussion regarding the pathogenesis and commonality of sinusitis. If he does have a history of seasonal allergies, they can make him more susceptible to sinus infections,  such as what he has experienced starting late summer of this year. Regarding the perceived nasal bump, we discussed that the cartilage will normally change with age, and he is feeling that change. With his history of anxiety, when he is worried about his nasal symptoms, he may touch his nose more, which can irritate the nose and possibly exacerbate symptoms.    Does have some possible erysipelas on the front of the nose, will prescribe a 10-day course of an antibiotic ointment to prevent risk of infection and to keep his noise moisturized to prevent bleeding. Additionally encouraged use of OTC moisturizers, such as Vaseline or Ayr nasal gel, and starting nasal rinses to clean out his sinuses to prevent build-up that could lead to sinus infections. Additionally, discussed 10-day course of antibiotics for possible sinus infection along with oral steroid to reduce inflammation. Patient is concerned about tachycardia from previous 1x oral steroid course, and we discussed reducing the dose if he experiences this common side effect. Will hold off on starting nasal sprays at this time. Finally, discussed imaging would be appropriate to determine if there are any underlying pathologies, including chronic sinusitis, which cannot be visualized during in-office exam, and patient is amenable to this.    Plan:  Start daily sinus rinses with NeilMed neti pot.  Prescribed prednisone: Take 3 tablets (30 mg) by mouth daily for 5 days, THEN 1 tablet (10 mg) daily for 5 days. He will contact us if he experiences tachycardia, and we will reduce the dose.  Prescribed Augmentin: Take 1 tablet by mouth 2 times daily for 10 days.  Prescribed mupirocin: Apply a small amount to the front of your nose twice daily for 10 days.  Once mupirocin course of 10 days has been completed, switch to Vaseline or Ayr nasal gel to keep nasal passages moisturized.  Ordered CT sinus to be completed 2 weeks after he completed Augmentin and prednisone.  Follow  up with me pending CT results.    Scribe Disclosure:   I, Felisha Pineda, am serving as a scribe to document services personally performed by Abdullahi Owens MD based on data collection and the provider's statements to me.     Provider Disclosure:  I agree with above History, Review of Systems, Physical exam and Plan. I have reviewed the content of the documentation and have edited it as needed. I have personally performed the services documented here and the documentation accurately represents those services and the decisions I have made.      Electronically signed by:    Abdullahi Owens MD    [facility]  Rhinology  Endoscopic Skull Base Surgery  [facility]  Department of Otolaryngology - Head & Neck Surgery    45 minutes spent on the date of the encounter doing chart review, review of outside records, review of test results, interpretation of tests, patient visit, documentation and discussion with other provider(s) outside of the procedure performed

## 2024-12-19 NOTE — LETTER
12/19/2024       RE: Jf Valdez  [address] [facility] [id number]     Dear Colleague,    Thank you for referring your patient, Jf Valdez, to the [facility] EAR NOSE AND THROAT CLINIC [place] at [facility]. Please see a copy of my visit note below.      [facility]  New Patient Visit      Encounter date:   December 19, 2024    Referring Provider:   Chintan Samuel PA-C    Chief Complaint: New Patient    History of Present Illness:   Jf Valdez is a 34 year old male who presents for consultation regarding nasal irritation and fibrous papule.    He was initially seen by KEVIN Samuel on 10/31/24:  Over the last few days patient has noticed a bump over the right aspect of his nasal alar crease.  There is no pain associated with this.  During his self examination m noted irritation along the septum of the right nostril.  No trauma or injury.  Not having any nosebleeds.     Irritation of nose  (primary encounter diagnosis)  Comment: Irritation along the right nostril kiesselbach's plexus  appears consistent with dry nasal tissue.  Discussed moisturizing regimen.  Using humidifier overnight.  Discussed signs and symptoms to watch for for epistaxis.  Plan: Adult ENT  Referral          Fibrous papule of nose  Comment: Apparent fibrous papule of the nose.  Patient has noticed this over the last few days.  There is no overlying erythema or skin changes.  No evidence of abscess.  Discussed potential ENT referral for further management.  Patient is very anxious about this episode.  Plan: Adult ENT  Referral      Today, he reports that he started having headaches in 7/2024, and was seen in the ED on 7/28/24. Then on 8/7/24, he was seen by FP and diagnosed with sinusitis as prescribed Augmentin BID x 10 days along with hydroxyzine HCl for anxiety, and also diagnosed with a tension headache.    He was then  seen by [facility] dermatology on 11/13/24 and 11/25/24, and both times they noted a normal exam of the nose with no identifiable external masses. On 12/4/24, he was seen by FP for another cold, which he believed he got from his kids, who were sick at the time. He was not prescribed antibiotics, as provider noted low suspicion for an active sinus infection.     Additionally, he reports some intermittent dryness, swelling, and ear pressure. He never really had nose issues prior to 7/2024. He notices lately when he blows his nose that there will be blood on the tissue, which will persist for 2-3 days during which he will put tissue in his nose to stop the bleeding and then it eventually stops, then it recurs. He has not tried nasal sprays, but did purchase a NeilMed neti pot, but has not used it yet. Endorses a history of possible seasonal allergies. Reports his heart was racing the one time he took prednisone in the past.    Sino-Nasal Outcome Test (SNOT - 22)  1. Need to Blow Nose: (Patient-Rptd) (P) Moderate  2. Nasal Blockage: (Patient-Rptd) (P) Moderate  3. Sneezing: (Patient-Rptd) (P) Very mild  4. Runny Nose: (Patient-Rptd) (P) Very mild  5. Cough: (Patient-Rptd) (P) None  6. Post-nasal discharge: (Patient-Rptd) (P) Mild or slight  7. Thick nasal discharge: (Patient-Rptd) (P) Moderate  8. Ear fullness: (Patient-Rptd) (P) Very mild  9. Dizziness: (Patient-Rptd) (P) None  10. Ear Pain: (Patient-Rptd) (P) None  11. Facial pain/pressure: (Patient-Rptd) (P) Moderate  12. Decreased Sense of Smell/Taste: (Patient-Rptd) (P) Mild or slight  13. Difficulty falling asleep: (Patient-Rptd) (P) None  14. Wake up at night: (Patient-Rptd) (P) Very mild  15. Lack of a good night's sleep: (Patient-Rptd) (P) Moderate  16. Wake up tired: (Patient-Rptd) (P) Mild or slight  17. Fatigue: (Patient-Rptd) (P) Very mild  18. Reduced Productivity: (Patient-Rptd) (P) Mild or slight  19. Reduced Concentration: (Patient-Rptd) (P) None  20.  Frustrated/restless/irritable: (Patient-Rptd) (P) Severe  21. Sad: (Patient-Rptd) (P) Moderate  22. Embarrassed: (Patient-Rptd) (P) None  Total Score: (Patient-Rptd) (P) 35    [place] Operative History:  1990 myringotomy with tube placement      Review of systems: A 14-point review of systems has been conducted and was negative for any notable symptoms, except as dictated in the history of present illness.     Medical History:  No past medical history on file.     Surgical History:   No past surgical history on file.     Family History:  Family History   Problem Relation Age of Onset     Hypertension Mother      Colon Cancer Paternal Grandfather 70     Colon Cancer Paternal Uncle 55        Social History:   Social History     Socioeconomic History     Marital status:    Tobacco Use     Smoking status: Never     Smokeless tobacco: Never   Vaping Use     Vaping status: Never Used   Substance and Sexual Activity     Alcohol use: Yes     Alcohol/week: 2.0 standard drinks of alcohol     Types: 2 Shots of liquor per week     Drug use: Never     Sexual activity: Yes     Social Drivers of Health     Financial Resource Strain: Unknown (11/6/2024)    Financial Resource Strain      Within the past 12 months, have you or your family members you live with been unable to get utilities (heat, electricity) when it was really needed?: Patient declined   Food Insecurity: Unknown (11/6/2024)    Food Insecurity      Within the past 12 months, did you worry that your food would run out before you got money to buy more?: Patient declined      Within the past 12 months, did the food you bought just not last and you didn t have money to get more?: Patient declined   Transportation Needs: Unknown (11/6/2024)    Transportation Needs      Within the past 12 months, has lack of transportation kept you from medical appointments, getting your medicines, non-medical meetings or appointments, work, or from getting things that you need?:  "Patient declined   Physical Activity: Inactive (11/6/2024)    Exercise Vital Sign      Days of Exercise per Week: 0 days      Minutes of Exercise per Session: 0 min   Stress: Stress Concern Present (11/6/2024)    [employer] [employer] - Occupational Stress Questionnaire      Feeling of Stress : To some extent   Social Connections: Unknown (11/6/2024)    Social Connection and Isolation Panel [NHANES]      Frequency of Social Gatherings with Friends and Family: Twice a week   Interpersonal Safety: Low Risk  (7/11/2024)    Interpersonal Safety      Do you feel physically and emotionally safe where you currently live?: Yes      Within the past 12 months, have you been hit, slapped, kicked or otherwise physically hurt by someone?: No      Within the past 12 months, have you been humiliated or emotionally abused in other ways by your partner or ex-partner?: No   Housing Stability: Unknown (11/6/2024)    Housing Stability      Do you have housing? : Patient declined      Are you worried about losing your housing?: Patient declined        Physical Exam:  Vital signs: BP (!) 140/91 (BP Location: Left arm, Patient Position: Sitting, Cuff Size: Adult Large)   Pulse 75   Ht 1.727 m (5' 8\")   Wt 84 kg (185 lb 3.2 oz)   SpO2 98%   BMI 28.16 kg/m     General Appearance: No acute distress, appropriate demeanor, conversant  Eyes: moist conjunctivae; EOMI; pupils symmetric; visual acuity grossly intact; no proptosis  Head: normocephalic; overall symmetric appearance without deformity  Face: overall symmetric without deformity  Ears: Normal appearance of external ear; external meatus normal in appearance  Nose: No external deformity  Oral Cavity/oropharynx: Normal appearance of mucosa  Neck: no palpable lymphadenopathy; thyroid without palpable nodules  Lungs: symmetric chest rise; no wheezing  CV: Good distal perfusion; normal hear rate  Extremities: No deformity  Neurologic Exam: Cranial nerves II-XII are " grossly intact; no focal deficit    Procedure Note  Procedure performed: Rigid nasal endoscopy  Indication: To evaluate for sinonasal pathology not visualized on routine anterior rhinoscopy  Anesthesia: 4% topical lidocaine with 0.05% oxymetazoline  Description of procedure: A 30 degree, 3 mm rigid endoscope was inserted into bilateral nasal cavities and the nasal valves, nasal cavity, middle meatus, sphenoethmoid recess, and nasopharynx were thoroughly evaluated for evidence of obstruction, edema, purulence, polyps and/or mass/lesion.     [place] Endoscopic Scoring System  Endoscopic observation Right Left   Polyps in middle meatus (0 = absent, 1 = restricted to middle meatus, 2 = Beyond middle meatus) 0 0   Discharge (0 = absent, 1 = thin and clear, 2 = thick, purulent) 1 1   Edema (0 = absent, 1 = mild-moderate, 2 = moderate-severe) 1 1   Crusting (0 = absent, 1 = mild-moderate, 2 = moderate-severe) 0 0   Scarring (0= absent, 1 = mild-moderate, 2 = moderate-severe) 0 0   Total 2 2     Findings  RT: Exam limited by septal deviation, but able to visualized nasopharynx and middle meatus without evidence of active infection.  LT: Thick drainage from the middle meatus that appears non-purulent. No evidence of mass or polyps.    The patient tolerated the procedure well without complication.     Imaging Review:  7/28/24 CT Head without Contrast ([facility])  INDICATION: Headache, chronic, new features or increased frequency   COMPARISON: None.     IMPRESSION:  1.  No CT evidence for acute intracranial process.   2.  Mild inflammatory changes of the paranasal sinuses.     Assessment/Medical Decision Making:  Jf Valdez is a 34 year old male whose symptoms are suggestive of a recent sinus infection with residual nasal swelling, which could lead to increase sinus and ear pain and pressure. On exam today, there are no concerning pathologies, and the nasal septal deviation to the right does not account for his  symptoms. His nasal passages are open, and sinus cavities look good. His nasal passages were dry, which can usually exacerbate nasal symptoms, especially in the winter.     We had a detailed discussion regarding the pathogenesis and commonality of sinusitis. If he does have a history of seasonal allergies, they can make him more susceptible to sinus infections, such as what he has experienced starting late summer of this year. Regarding the perceived nasal bump, we discussed that the cartilage will normally change with age, and he is feeling that change. With his history of anxiety, when he is worried about his nasal symptoms, he may touch his nose more, which can irritate the nose and possibly exacerbate symptoms.    Does have some possible erysipelas on the front of the nose, will prescribe a 10-day course of an antibiotic ointment to prevent risk of infection and to keep his noise moisturized to prevent bleeding. Additionally encouraged use of OTC moisturizers, such as Vaseline or Ayr nasal gel, and starting nasal rinses to clean out his sinuses to prevent build-up that could lead to sinus infections. Additionally, discussed 10-day course of antibiotics for possible sinus infection along with oral steroid to reduce inflammation. Patient is concerned about tachycardia from previous 1x oral steroid course, and we discussed reducing the dose if he experiences this common side effect. Will hold off on starting nasal sprays at this time. Finally, discussed imaging would be appropriate to determine if there are any underlying pathologies, including chronic sinusitis, which cannot be visualized during in-office exam, and patient is amenable to this.    Plan:  Start daily sinus rinses with NeilMed neti pot.  Prescribed prednisone: Take 3 tablets (30 mg) by mouth daily for 5 days, THEN 1 tablet (10 mg) daily for 5 days. He will contact us if he experiences tachycardia, and we will reduce the dose.  Prescribed Augmentin:  Take 1 tablet by mouth 2 times daily for 10 days.  Prescribed mupirocin: Apply a small amount to the front of your nose twice daily for 10 days.  Once mupirocin course of 10 days has been completed, switch to Vaseline or Ayr nasal gel to keep nasal passages moisturized.  Ordered CT sinus to be completed 2 weeks after he completed Augmentin and prednisone.  Follow up with me pending CT results.    Scribe Disclosure:   I, Felisha Pineda, am serving as a scribe to document services personally performed by Abdullahi Owens MD based on data collection and the provider's statements to me.     Provider Disclosure:  I agree with above History, Review of Systems, Physical exam and Plan. I have reviewed the content of the documentation and have edited it as needed. I have personally performed the services documented here and the documentation accurately represents those services and the decisions I have made.      Electronically signed by:    Abdullahi Owens MD    [facility]  Rhinology  Endoscopic Skull Base Surgery  [facility]  Department of Otolaryngology - Head & Neck Surgery    45 minutes spent on the date of the encounter doing chart review, review of outside records, review of test results, interpretation of tests, patient visit, documentation and discussion with other provider(s) outside of the procedure performed      Again, thank you for allowing me to participate in the care of your patient.      Sincerely,    Abdullahi Owens MD

## 2024-12-19 NOTE — PATIENT INSTRUCTIONS
You were seen in the ENT Clinic today by Dr. Owens. If you have any questions or concerns after your appointment, please contact us (see below)       2.   The following recommendations have been made based upon your appointment today:   -Augmentin: 1 tablet by mouth twice daily for 10 days.   -Mupirocin: Apply to the front of your nose twice daily for 10 days.   -Prednisone: 30 mg by mouth for 5 days, 10 mg by mouth for 5 days, then stop.   -Obtain CT sinus scan in 2-3 weeks after completion of the antibiotics.      3.   Plan is to follow up with a treatment plan based upon results of your CT scan. We will follow up with you on results once Dr. Owens has a chance to review them.            How to Contact Us:  Send a [employer] message to your provider. Our team will respond to you via [employer]. Occasionally, we will need to call you to get further information.  For urgent matters (Monday-Friday), call the ENT Clinic: [phone] and speak with a call center team member - they will route your call appropriately.   If you'd like to speak directly with a nurse, please find our contact information below. We do our best to check voicemail frequently throughout the day, and will work to call you back within 1-2 days. For urgent matters, please use the general clinic phone numbers listed above.     Angy BLANCAS RN  Direct: [phone]  Karla NAVA LPN  Direct: [phone]         [facility]  Department of Otolaryngology

## 2024-12-19 NOTE — NURSING NOTE
"Chief Complaint   Patient presents with    Consult   Blood pressure (!) 140/91, pulse 75, height 1.727 m (5' 8\"), weight 84 kg (185 lb 3.2 oz), SpO2 98%. Michael Holguin, EMT  "

## 2025-01-03 ENCOUNTER — MYC MEDICAL ADVICE (OUTPATIENT)
Dept: OTOLARYNGOLOGY | Facility: CLINIC | Age: 35
End: 2025-01-03
Payer: COMMERCIAL

## 2025-01-07 NOTE — TELEPHONE ENCOUNTER
Writer spoke to pt to inform him that Dr. Owens has reviewed his sinus CT and stated that there is nothing concerning, no mass or lesion. The CT did show some inflammation but this was discussed at pts appointment, no evidence of active infection. Pt stated that he has noticed his nose is dry and blood tinged drainage, writer recommended Vaseline or ayr nasal gel daily along with a humidifier. Pt expressed understanding.      Angy Murcia RN

## 2025-01-21 ENCOUNTER — PRE VISIT (OUTPATIENT)
Dept: OTOLARYNGOLOGY | Facility: CLINIC | Age: 35
End: 2025-01-21

## 2025-01-26 ENCOUNTER — MYC MEDICAL ADVICE (OUTPATIENT)
Dept: OTOLARYNGOLOGY | Facility: CLINIC | Age: 35
End: 2025-01-26
Payer: COMMERCIAL

## 2025-03-05 ENCOUNTER — NURSE TRIAGE (OUTPATIENT)
Dept: FAMILY MEDICINE | Facility: CLINIC | Age: 35
End: 2025-03-05
Payer: COMMERCIAL

## 2025-03-06 ENCOUNTER — OFFICE VISIT (OUTPATIENT)
Dept: FAMILY MEDICINE | Facility: CLINIC | Age: 35
End: 2025-03-06
Payer: COMMERCIAL

## 2025-03-06 VITALS
HEART RATE: 65 BPM | TEMPERATURE: 97.2 F | OXYGEN SATURATION: 98 % | BODY MASS INDEX: 29.7 KG/M2 | DIASTOLIC BLOOD PRESSURE: 88 MMHG | WEIGHT: 196 LBS | SYSTOLIC BLOOD PRESSURE: 130 MMHG | RESPIRATION RATE: 16 BRPM | HEIGHT: 68 IN

## 2025-03-06 DIAGNOSIS — Z12.11 COLON CANCER SCREENING: ICD-10-CM

## 2025-03-06 DIAGNOSIS — Z83.719 FH: COLON POLYPS: ICD-10-CM

## 2025-03-06 DIAGNOSIS — K62.5 BRBPR (BRIGHT RED BLOOD PER RECTUM): Primary | ICD-10-CM

## 2025-03-06 ASSESSMENT — PAIN SCALES - GENERAL: PAINLEVEL_OUTOF10: NO PAIN (0)

## 2025-03-06 NOTE — TELEPHONE ENCOUNTER
This encounter is being created due to the [employer] message dated 3/5/2025 as written below:   Arun Andradeover Nurse [facility] (supporting Lew Posey PA-C)12 hours ago (8:18 PM)       Hello. I scheduled an appointment but was hoping to get in earlier or possible asap. I went to bathroom tonight and noticed blood in my stool and when wiping. Thank you  Reason for Disposition    Rectal bleeding is minimal (e.g., blood just on toilet paper, a few drops in toilet bowl), and bleeding recurs 3 or more times using Care Advice    Family history of cancer of intestines    Additional Information    Negative: Passed out (e.g., fainted, lost consciousness, blacked out and was not responding)    Negative: Shock suspected (e.g., cold/pale/clammy skin, too weak to stand, low BP, rapid pulse)    Negative: Vomiting red blood or black (coffee ground) material    Negative: Sounds like a life-threatening emergency to the triager    Negative: SEVERE rectal bleeding (large blood clots; constant or on and off bleeding)    Negative: SEVERE dizziness (e.g., unable to stand, requires support to walk, feels like passing out now)    Negative: MODERATE rectal bleeding (small blood clots, passing blood without stool, or toilet water turns red) more than once a day    Negative: Bloody, black, or tarry bowel movements  (Exception: Chronic-unchanged black-grey bowel movements and is taking iron pills or Pepto-Bismol.)    Negative: High-risk adult (e.g., prior surgery on aorta, abdominal aortic aneurysm)    Negative: Rectal foreign body (inserted or swallowed)    Negative: SEVERE abdominal pain (e.g., excruciating)    Negative: Constant abdominal pain lasting > 2 hours    Negative: Pale skin (pallor) of new-onset or getting worse    Negative: MODERATE rectal bleeding (small blood clots, passing blood without stool, or toilet water turns red)    Negative: Taking Coumadin (warfarin) or other strong blood thinner, or  known bleeding disorder (e.g., thrombocytopenia)    Negative: Colonoscopy in past 72 hours    Negative: Known cirrhosis of the liver (or history of liver failure or ascites)    Negative: Patient wants to be seen    Negative: MILD rectal bleeding (more than just a few drops or streaks)    Negative: Cancer of rectum or intestines (colon)    Negative: Radiation therapy to lower abdomen or pelvis    Negative: Rectal bleeding is a chronic symptom (recurrent or ongoing AND present > 4 weeks)    Protocols used: Rectal Bleeding-A-OH

## 2025-03-06 NOTE — TELEPHONE ENCOUNTER
Patient reports that early yesterday he had a normal BM (bowel movement) and everything was fine. Last night he felt like he had to have a BM. When he went it was a very small amount and it the stool had a dime size spot of BRB on the surface of the stool. He reports that he usually has a BM daily and before yesterday it was 2-3 days since he had had a BM. He did not have to force the stool out. He also had blood on the toilet paper when he wiped.   He does report that he has had a stomach ulcer before, years ago. Took reflux medication and ate chicken and rice for a month and it went away, per patient.   Yesterday he felt flush but reports that he does have anxiety    Denies: hemorrhoids, abdominal pain, diarrhea, hx of constipation, blood thinners       Nursing advice: Patient to be assessed in 2 weeks. I was able to get the patient in today for an appointment for assessment and he is able to make it.  If anything worsens in the meantime he is to go to the E.R. immediately.    Next 5 appointments (look out 90 days)      Mar 06, 2025 10:30 AM  (Arrive by 10:10 AM)  Provider Visit with Lew Posey PA-C  [facility] ([facility] ) [address] [facility] [address] [phone]

## 2025-03-06 NOTE — PROGRESS NOTES
"  Assessment & Plan       ICD-10-CM    1. BRBPR (bright red blood per rectum)  K62.5 Colonoscopy Screening  Referral      2. Colon cancer screening  Z12.11 Colonoscopy Screening  Referral      3. FH: colon polyps  Z83.719 Colonoscopy Screening  Referral      Talk to patient about his concerns.  Unclear etiology at this time.  Due to his family history of colon polyps will update a colonoscopy.  Warning signs were discussed.  We did talked about use of Metamucil.  Recheck in a month as needed.      Katelynn Dias is a 34 year old, presenting for the following health issues:  Rectal Problem        3/6/2025     9:51 AM   Additional Questions   Roomed by alexis   Accompanied by self         3/6/2025     9:51 AM   Patient Reported Additional Medications   Patient reports taking the following new medications no     History of Present Illness       Reason for visit:  Blood in stool and wiping  Symptom onset:  1-3 days ago  Symptoms include:  Blood  Symptom intensity:  Moderate  Symptom progression:  Staying the same  Had these symptoms before:  No  What makes it worse:  No  What makes it better:  No   He is taking medications regularly.      When couple days with out BM and then had a BM yesterday and noticed small amount of blood in toilet and paper.  No hard stools or history of constipation.  No abdominal pains.   No fevers, chills or body aches.   He does not have a family history of colon cancer in some grandparents.  He recently found out that his brother and sister who are in their 40s had colon polyps.    Review of Systems  Constitutional, HEENT, cardiovascular, pulmonary, gi and gu systems are negative, except as otherwise noted.      Objective    /88   Pulse 65   Temp 97.2  F (36.2  C) (Tympanic)   Resp 16   Ht 1.727 m (5' 8\")   Wt 88.9 kg (196 lb)   SpO2 98%   BMI 29.80 kg/m    Body mass index is 29.8 kg/m .  Physical Exam   GENERAL: alert and no distress  NECK: no " adenopathy, no asymmetry, masses, or scars  RESP: lungs clear to auscultation - no rales, rhonchi or wheezes  CV: regular rate and rhythm, normal S1 S2, no S3 or S4, no murmur, click or rub, no peripheral edema  ABDOMEN: soft, nontender, no hepatosplenomegaly, no masses and bowel sounds normal  RECTAL (female): normal sphincter tone, no rectal masses            Signed Electronically by: Lew Posey PA-C

## 2025-03-17 ENCOUNTER — MYC MEDICAL ADVICE (OUTPATIENT)
Dept: FAMILY MEDICINE | Facility: CLINIC | Age: 35
End: 2025-03-17
Payer: COMMERCIAL

## 2025-03-24 ENCOUNTER — TELEPHONE (OUTPATIENT)
Dept: GASTROENTEROLOGY | Facility: CLINIC | Age: 35
End: 2025-03-24
Payer: COMMERCIAL

## 2025-03-24 NOTE — TELEPHONE ENCOUNTER
Bowel Prep Review:  Disclaimer: No call was made to the patient.     Standard Miralax bowel prep.    Recommended due to standard bowel prep.   Instructions were sent via [employer].       Sally Jin LPN  Endoscopy Procedure Pre Assessment

## 2025-03-27 ENCOUNTER — MYC MEDICAL ADVICE (OUTPATIENT)
Dept: GASTROENTEROLOGY | Facility: CLINIC | Age: 35
End: 2025-03-27

## 2025-03-27 ENCOUNTER — TELEPHONE (OUTPATIENT)
Dept: GASTROENTEROLOGY | Facility: CLINIC | Age: 35
End: 2025-03-27

## 2025-03-27 ENCOUNTER — OFFICE VISIT (OUTPATIENT)
Dept: FAMILY MEDICINE | Facility: CLINIC | Age: 35
End: 2025-03-27
Payer: COMMERCIAL

## 2025-03-27 VITALS
OXYGEN SATURATION: 99 % | RESPIRATION RATE: 16 BRPM | BODY MASS INDEX: 28.13 KG/M2 | SYSTOLIC BLOOD PRESSURE: 147 MMHG | DIASTOLIC BLOOD PRESSURE: 87 MMHG | TEMPERATURE: 97.5 F | WEIGHT: 185 LBS | HEART RATE: 70 BPM

## 2025-03-27 DIAGNOSIS — K62.5 BRBPR (BRIGHT RED BLOOD PER RECTUM): Primary | ICD-10-CM

## 2025-03-27 DIAGNOSIS — Z30.2 ENCOUNTER FOR VASECTOMY: Primary | ICD-10-CM

## 2025-03-27 ASSESSMENT — PAIN SCALES - GENERAL: PAINLEVEL_OUTOF10: NO PAIN (0)

## 2025-03-27 NOTE — TELEPHONE ENCOUNTER
Pre visit planning completed.      Procedure details:    Patient scheduled for Colonoscopy on 4/14/25.     Arrival time: 1400. Procedure time 1500    Facility location: [facility]; [address] , [place] [address]. Check in location: Main entrance at Surgery registation desk.    Sedation type: MAC    Pre op exam needed? No.    Indication for procedure: Bright red blood per rectum      Chart review:     Electronic implanted devices? No    Recent diagnosis of diverticulitis within the last 6 weeks? No      Medication review:    Diabetic? No    Anticoagulants? No    Weight loss medication/injectable? No GLP-1 medication per patient's medication list. Nursing to verify with pre-assessment call.    Other medication HOLDING recommendations:  N/A      Prep for procedure:     Bowel prep recommendation: Standard Miralax.   Due to: standard bowel prep    Procedure information and instructions sent via [employer] R[employer] RN  Endoscopy Procedure Pre Assessment   [phone] option 3

## 2025-03-27 NOTE — PROGRESS NOTES
SUBJECTIVE:  Jf Valdez is a 34 year old male who presents for his vasectomy. The consent form was discussed and signed by the patient.  An opportunity for questions was given, these were answered to his satisfaction    OBJECTIVE/Procedure Note  BP (!) 147/87   Pulse 70   Temp 97.5  F (36.4  C) (Tympanic)   Resp 16   Wt 83.9 kg (185 lb)   SpO2 99%   BMI 28.13 kg/m      On exam, this is a well-developed, well-nourished male.     Both vas deferens were easily identified by palpation through the scrotal skin.  No abnormalities were noted.  A sterile field was created using sterile gloves,betadine and sterile drapes. The left vas deferens was palpated and brought forward to the anterior scrotal skin. Lidocaine without epinephrine was injected to create a local skin block and also higher up around the vas deferens to create a nerve block. After a minute a towel clamp was used to grasp the vas deferens through the skin. Next, an incision was made overlying the vas deferens measuring approximately 1-2 cm in length. The vas deferens was dissected away from the fascia and a loop was brought up through the incision. The vas ends where then clamped and a section excised. The loop was transected at the proximal and distal ends with electroautery. The two ends were then cauterized further and the wound was observed for bleeding. Any bleeding was controlled with electrocautery.  Attention was then turned to the right side and a similar procedure was performed. Next the incisions were closed with a subcutaneous suture of 4-0 chromic.    ASSESSMENT:  Vasectomy was performed without complications.    PLAN:  He was reminded that he should  take several days off from work and any physical activity and essentially rest for two days with ice packs and ibuprofen for discomfort. He was also reminded that he will need to bring a semen sample in 3 months to make sure that he has cleared the urinary tract  of any residual sperm  and to make sure that he is sterile. He was reminded that he should continue to use contraception until we have proven that he is sterile.    Lew Posey PA-C

## 2025-03-27 NOTE — TELEPHONE ENCOUNTER
Pre assessment completed for upcoming procedure.   (Please see previous telephone encounter notes for complete details)      Procedure details:    Arrival time and facility location reviewed.    Pre op exam needed? No.    Designated  policy reviewed. Instructed to have someone stay 24  hours post procedure.       Medication review:    Medications reviewed. Please see supporting documentation below. Holding recommendations discussed (if applicable).       Prep for procedure:     Procedure prep instructions reviewed.        Any additional information needed:  N/A      Patient verbalized understanding and had no questions or concerns at this time.      Sally Jin LPN  Endoscopy Procedure Pre Assessment   [phone] option 3

## 2025-03-27 NOTE — PATIENT INSTRUCTIONS
Information for your Vasectomy.            Feel free to wear compression shorts/ or arthletic supporter for comfort.     Plan on very light activity for 1 wk with no lifting more that 20 lbs.     I recommend taking 2 days to recline with Icing 20 mins every couple yours.     Ok to use: non-steroidal anti-inflammatory medication like: Ibuprofen 600-800 mg three times daily or Aleve twice daily and/ or Tylenol 1-2 tabs po q6h as needed.    Abstain  from intercourse for about 1 wk so you can heal.    You can shower normally the next day. Just be gentle around that area.     You are not consider sterile until you leave a semen sample that doesn't contain sperm. (usually done about 3 months after vasectomy)    You will need to ejaculate at least 20 times between your surgery and the first sample.     Your may go home after procedure is complete.  There may be some pain in your groin for 3 to 4 days after the procedure.  You may have some blood or yellow liquid that loses from the incision sites.  He may have some swelling and bruising.  This is all normal.    Sutures will dissolve on their own and that can take a week or 2.    Watch for signs of infection which may include fever, pussy discharge, increased pain, swelling or redness.  Please contact us if this occurs.    Feel free to reach out to me with any questions that you have.     It is important that you rest for the first 48 hours and keep your activities minimal.        Thanks,  Lew Posey PA-C

## 2025-03-31 ENCOUNTER — MYC MEDICAL ADVICE (OUTPATIENT)
Dept: FAMILY MEDICINE | Facility: CLINIC | Age: 35
End: 2025-03-31
Payer: COMMERCIAL

## 2025-03-31 LAB
PATH REPORT.COMMENTS IMP SPEC: ABNORMAL
PATH REPORT.COMMENTS IMP SPEC: YES
PATH REPORT.COMMENTS IMP SPEC: YES
PATH REPORT.FINAL DX SPEC: ABNORMAL
PATH REPORT.FINAL DX SPEC: ABNORMAL
PATH REPORT.GROSS SPEC: ABNORMAL
PATH REPORT.GROSS SPEC: ABNORMAL
PATH REPORT.MICROSCOPIC SPEC OTHER STN: ABNORMAL
PATH REPORT.MICROSCOPIC SPEC OTHER STN: ABNORMAL
PATH REPORT.RELEVANT HX SPEC: ABNORMAL
PATH REPORT.RELEVANT HX SPEC: ABNORMAL
PHOTO IMAGE: ABNORMAL
PHOTO IMAGE: ABNORMAL

## 2025-03-31 NOTE — TELEPHONE ENCOUNTER
This RN attempted to reach patient on 3/31/25.  Left message to return call.      If they call back:    Please triage for blood in stool.  Mixed in blood.  See Mychart message.      If appropriate please refer to ADS.        Kristina Kjellberg, MSN, RN

## 2025-04-01 ENCOUNTER — NURSE TRIAGE (OUTPATIENT)
Dept: FAMILY MEDICINE | Facility: CLINIC | Age: 35
End: 2025-04-01
Payer: COMMERCIAL

## 2025-04-01 NOTE — TELEPHONE ENCOUNTER
"Nurse Triage SBAR    Is this a 2nd Level Triage? NO    Situation: Received call via red line.    Blood in stool. On going for two weeks. Went to ER yesterday, [facility]. Has colonoscopy scheduled in two weeks 4/14/25. Bright red in color. Last bloody stool was yesterday morning. Mixed with stool. No blood on tissue paper. No bleeding when not passing stool. No diarrhea, constipation, N/V, fevers. Reports household had stomach bug two weeks ago had diarrhea then some constipation where he was straining but has not had this for two weeks. Reports bowel movements are now soft and formed. Intermittent abdominal pain. Patient reports is mild \"I wouldn't even say it's like cramping\". Patient reports he has a lot of anxiety and wants to make sure he shouldn't be doing anything else while waiting for colonoscopy.     Background: 3/6/25 office visit with PCP, 3/31/25 ER visit - both visits for BRBPR    Assessment: A/O    Protocol Recommended Disposition:   See in Office Within 3 Days    Recommendation: Did advise that protocol recommends to be seen in 3 days, however, patient has been seen for this and there is a plan in place. Patient denies symptoms are worsening. Did encourage fiber and water intake. Reviewed what to monitor for and when to call back. Patient agrees with plan and would like PCP to review and advise if more should be done before colonoscopy or not.     Routed to provider and primary nurse pool.    Does the patient meet one of the following criteria for ADS visit consideration? 16+ years old, with an [facility] PCP     TIP  Providers, please consider if this condition is appropriate for management at one of our Acute and Diagnostic Services sites.     If patient is a good candidate, please use dotphrase <dot>triageresponse and select Refer to ADS to document.    Mary Hernandez RN on 4/1/2025 at 10:42 AM    Reason for Disposition   MILD rectal bleeding (more than just a few drops or streaks)    Additional " "Information   Negative: Passed out (e.g., fainted, lost consciousness, blacked out and was not responding)   Negative: Shock suspected (e.g., cold/pale/clammy skin, too weak to stand, low BP, rapid pulse)   Negative: Vomiting red blood or black (coffee ground) material   Negative: Sounds like a life-threatening emergency to the triager   Negative: Diarrhea is main symptom   Negative: Rectal symptoms   Negative: SEVERE rectal bleeding (large blood clots; constant or on and off bleeding)   Negative: SEVERE dizziness (e.g., unable to stand, requires support to walk, feels like passing out now)   Negative: MODERATE rectal bleeding (small blood clots, passing blood without stool, or toilet water turns red) more than once a day   Negative: Bloody, black, or tarry bowel movements  (Exception: Chronic-unchanged black-grey bowel movements and is taking iron pills or Pepto-Bismol.)   Negative: High-risk adult (e.g., prior surgery on aorta, abdominal aortic aneurysm)   Negative: Rectal foreign body (inserted or swallowed)   Negative: SEVERE abdominal pain (e.g., excruciating)   Negative: Constant abdominal pain lasting > 2 hours   Negative: Pale skin (pallor) of new-onset or getting worse   Negative: Patient sounds very sick or weak to the triager   Negative: MODERATE rectal bleeding (small blood clots, passing blood without stool, or toilet water turns red)   Negative: Taking Coumadin (warfarin) or other strong blood thinner, or known bleeding disorder (e.g., thrombocytopenia)   Negative: Colonoscopy in past 72 hours   Negative: Known cirrhosis of the liver (or history of liver failure or ascites)   Negative: Patient wants to be seen    Answer Assessment - Initial Assessment Questions  1. APPEARANCE of BLOOD: \"What color is it?\" \"Is it passed separately, on the surface of the stool, or mixed in with the stool?\"       Bright red  2. AMOUNT: \"How much blood was passed?\"       unsure  3. FREQUENCY: \"How many times has blood been " "passed with the stools?\"       Two times  4. ONSET: \"When was the blood first seen in the stools?\" (Days or weeks)       Two weeks ago  5. DIARRHEA: \"Is there also some diarrhea?\" If Yes, ask: \"How many diarrhea stools in the past 24 hours?\"       no  6. CONSTIPATION: \"Do you have constipation?\" If Yes, ask: \"How bad is it?\"      no  7. RECURRENT SYMPTOMS: \"Have you had blood in your stools before?\" If Yes, ask: \"When was the last time?\" and \"What happened that time?\"       no  8. BLOOD THINNERS: \"Do you take any blood thinners?\" (e.g., Coumadin/warfarin, Pradaxa/dabigatran, aspirin)      no  9. OTHER SYMPTOMS: \"Do you have any other symptoms?\"  (e.g., abdomen pain, vomiting, dizziness, fever)      Intermittent abdominal pains  10. PREGNANCY: \"Is there any chance you are pregnant?\" \"When was your last menstrual period?\"        na    Protocols used: Rectal Bleeding-A-OH    "

## 2025-04-01 NOTE — TELEPHONE ENCOUNTER
Attempted to reach pt to relay provider 4/1/2025  1:21 PM  and 4/1/2025  1:33 PM messages below as written. There was no answer. Left message to return call to a nurse at [phone].    Sally Sanchez, KRISTAN, RN

## 2025-04-01 NOTE — TELEPHONE ENCOUNTER
Left message on answering machine for patient to call back to [phone].  Radha Youngblood BSN, RN

## 2025-04-01 NOTE — TELEPHONE ENCOUNTER
Unless Lew has other thoughts, as long as patient is not SOB, having chest pain, moderate to severe abdominal pain, fevers, dizziness  and the bleeding is controlled, that the original plan is good. If he experiences any of the mentioned symptoms, though, I would recommend going back to ER.

## 2025-04-01 NOTE — TELEPHONE ENCOUNTER
Sorry didn't get to this sooner.     Yes agree with Faith. Patient is stable. Reassure him.  Await colonoscopy.     Thanks  Lew Posey PA-C

## 2025-04-01 NOTE — TELEPHONE ENCOUNTER
Pt notified of provider message as written. Pt verbalized good understanding.No additional questions at this time.    Cherise Uribe, BSN, RN

## 2025-04-14 ENCOUNTER — ANESTHESIA EVENT (OUTPATIENT)
Dept: GASTROENTEROLOGY | Facility: CLINIC | Age: 35
End: 2025-04-14
Payer: COMMERCIAL

## 2025-04-14 ENCOUNTER — ANESTHESIA (OUTPATIENT)
Dept: GASTROENTEROLOGY | Facility: CLINIC | Age: 35
End: 2025-04-14
Payer: COMMERCIAL

## 2025-04-14 ENCOUNTER — HOSPITAL ENCOUNTER (OUTPATIENT)
Facility: CLINIC | Age: 35
Discharge: HOME | End: 2025-04-14
Attending: SURGERY | Admitting: SURGERY
Payer: COMMERCIAL

## 2025-04-14 VITALS
HEART RATE: 60 BPM | TEMPERATURE: 97.9 F | DIASTOLIC BLOOD PRESSURE: 94 MMHG | SYSTOLIC BLOOD PRESSURE: 122 MMHG | RESPIRATION RATE: 16 BRPM | OXYGEN SATURATION: 98 %

## 2025-04-14 LAB — COLONOSCOPY: NORMAL

## 2025-04-14 PROCEDURE — 370N000017 HC ANESTHESIA TECHNICAL FEE, PER MIN: Performed by: SURGERY

## 2025-04-14 PROCEDURE — 250N000011 HC RX IP 250 OP 636: Performed by: NURSE ANESTHETIST, CERTIFIED REGISTERED

## 2025-04-14 PROCEDURE — 258N000003 HC RX IP 258 OP 636: Performed by: NURSE ANESTHETIST, CERTIFIED REGISTERED

## 2025-04-14 PROCEDURE — 45378 DIAGNOSTIC COLONOSCOPY: CPT | Performed by: SURGERY

## 2025-04-14 PROCEDURE — 250N000009 HC RX 250: Performed by: NURSE ANESTHETIST, CERTIFIED REGISTERED

## 2025-04-14 RX ORDER — LIDOCAINE 40 MG/G
CREAM TOPICAL
Status: DISCONTINUED | OUTPATIENT
Start: 2025-04-14 | End: 2025-04-14 | Stop reason: HOSPADM

## 2025-04-14 RX ORDER — SODIUM CHLORIDE, SODIUM LACTATE, POTASSIUM CHLORIDE, CALCIUM CHLORIDE 600; 310; 30; 20 MG/100ML; MG/100ML; MG/100ML; MG/100ML
INJECTION, SOLUTION INTRAVENOUS CONTINUOUS
Status: DISCONTINUED | OUTPATIENT
Start: 2025-04-14 | End: 2025-04-14 | Stop reason: HOSPADM

## 2025-04-14 RX ORDER — NALOXONE HYDROCHLORIDE 0.4 MG/ML
0.2 INJECTION, SOLUTION INTRAMUSCULAR; INTRAVENOUS; SUBCUTANEOUS
Status: DISCONTINUED | OUTPATIENT
Start: 2025-04-14 | End: 2025-04-14 | Stop reason: HOSPADM

## 2025-04-14 RX ORDER — ONDANSETRON 2 MG/ML
4 INJECTION INTRAMUSCULAR; INTRAVENOUS
Status: DISCONTINUED | OUTPATIENT
Start: 2025-04-14 | End: 2025-04-14 | Stop reason: HOSPADM

## 2025-04-14 RX ORDER — PROPOFOL 10 MG/ML
INJECTION, EMULSION INTRAVENOUS PRN
Status: DISCONTINUED | OUTPATIENT
Start: 2025-04-14 | End: 2025-04-14

## 2025-04-14 RX ORDER — ONDANSETRON 2 MG/ML
4 INJECTION INTRAMUSCULAR; INTRAVENOUS EVERY 6 HOURS PRN
Status: DISCONTINUED | OUTPATIENT
Start: 2025-04-14 | End: 2025-04-14 | Stop reason: HOSPADM

## 2025-04-14 RX ORDER — LIDOCAINE HYDROCHLORIDE 20 MG/ML
INJECTION, SOLUTION INFILTRATION; PERINEURAL PRN
Status: DISCONTINUED | OUTPATIENT
Start: 2025-04-14 | End: 2025-04-14

## 2025-04-14 RX ORDER — PROCHLORPERAZINE MALEATE 5 MG/1
10 TABLET ORAL EVERY 6 HOURS PRN
Status: DISCONTINUED | OUTPATIENT
Start: 2025-04-14 | End: 2025-04-14 | Stop reason: HOSPADM

## 2025-04-14 RX ORDER — FLUMAZENIL 0.1 MG/ML
0.2 INJECTION, SOLUTION INTRAVENOUS
Status: DISCONTINUED | OUTPATIENT
Start: 2025-04-14 | End: 2025-04-14 | Stop reason: HOSPADM

## 2025-04-14 RX ORDER — NALOXONE HYDROCHLORIDE 0.4 MG/ML
0.4 INJECTION, SOLUTION INTRAMUSCULAR; INTRAVENOUS; SUBCUTANEOUS
Status: DISCONTINUED | OUTPATIENT
Start: 2025-04-14 | End: 2025-04-14 | Stop reason: HOSPADM

## 2025-04-14 RX ORDER — ONDANSETRON 4 MG/1
4 TABLET, ORALLY DISINTEGRATING ORAL EVERY 6 HOURS PRN
Status: DISCONTINUED | OUTPATIENT
Start: 2025-04-14 | End: 2025-04-14 | Stop reason: HOSPADM

## 2025-04-14 RX ORDER — PROPOFOL 10 MG/ML
INJECTION, EMULSION INTRAVENOUS CONTINUOUS PRN
Status: DISCONTINUED | OUTPATIENT
Start: 2025-04-14 | End: 2025-04-14

## 2025-04-14 RX ADMIN — PROPOFOL 100 MG: 10 INJECTION, EMULSION INTRAVENOUS at 15:20

## 2025-04-14 RX ADMIN — PROPOFOL 200 MCG/KG/MIN: 10 INJECTION, EMULSION INTRAVENOUS at 15:20

## 2025-04-14 RX ADMIN — SODIUM CHLORIDE, SODIUM LACTATE, POTASSIUM CHLORIDE, AND CALCIUM CHLORIDE: .6; .31; .03; .02 INJECTION, SOLUTION INTRAVENOUS at 14:28

## 2025-04-14 RX ADMIN — LIDOCAINE HYDROCHLORIDE 50 MG: 20 INJECTION, SOLUTION INFILTRATION; PERINEURAL at 15:20

## 2025-04-14 ASSESSMENT — ACTIVITIES OF DAILY LIVING (ADL)
ADLS_ACUITY_SCORE: 41

## 2025-04-14 NOTE — ANESTHESIA CARE TRANSFER NOTE
Patient: Jf Valdez    Procedure: Procedure(s):  Colonoscopy       Diagnosis: BRBPR (bright red blood per rectum) [K62.5]  Diagnosis Additional Information: No value filed.    Anesthesia Type:   MAC     Note:    Oropharynx: oropharynx clear of all foreign objects and spontaneously breathing  Level of Consciousness: awake  Oxygen Supplementation: room air    Independent Airway: airway patency satisfactory and stable  Dentition: dentition unchanged  Vital Signs Stable: post-procedure vital signs reviewed and stable  Report to RN Given: handoff report given  Patient transferred to: Phase II    Handoff Report: Identifed the Patient, Identified the Reponsible Provider, Reviewed the pertinent medical history, Discussed the surgical course, Reviewed Intra-OP anesthesia mangement and issues during anesthesia, Set expectations for post-procedure period and Allowed opportunity for questions and acknowledgement of understanding  Vitals:  Vitals Value Taken Time   /86 04/14/25 1534   Temp     Pulse 74 04/14/25 1534   Resp     SpO2 99 % 04/14/25 1536   Vitals shown include unfiled device data.    Electronically Signed By: JOSÉ LUIS Ogden CRNA  April 14, 2025  3:37 PM

## 2025-04-14 NOTE — H&P
Patient seen for Endoscopy    HPI:  Patient is a 34 year old male here for endoscopy. Not currently taking blood thinning medications/held for procedure if they do. No recent MI or CVA history. No issues with previous sedation. No recent acute illness.    Review Of Systems    Skin: negative  Ears/Nose/Throat: negative  Respiratory: No shortness of breath, dyspnea on exertion, cough, or hemoptysis  Cardiovascular: negative  Gastrointestinal: negative  Genitourinary: negative  Musculoskeletal: negative  Neurologic: negative  Hematologic/Lymphatic/Immunologic: negative  Endocrine: negative      No past medical history on file.    No past surgical history on file.    Family History   Problem Relation Age of Onset    Hypertension Mother     Colon Polyps Sister     Colon Polyps Brother     Colon Cancer Paternal Grandfather 70    Colon Cancer Paternal Uncle 55       Social History     Socioeconomic History    Marital status:      Spouse name: Not on file    Number of children: Not on file    Years of education: Not on file    Highest education level: Not on file   Occupational History    Not on file   Tobacco Use    Smoking status: Never    Smokeless tobacco: Never   Vaping Use    Vaping status: Never Used   Substance and Sexual Activity    Alcohol use: Yes     Alcohol/week: 2.0 standard drinks of alcohol     Types: 2 Shots of liquor per week    Drug use: Never    Sexual activity: Yes   Other Topics Concern    Not on file   Social History Narrative    Not on file     Social Drivers of Health     Financial Resource Strain: Unknown (11/6/2024)    Financial Resource Strain     Within the past 12 months, have you or your family members you live with been unable to get utilities (heat, electricity) when it was really needed?: Patient declined   Food Insecurity: Unknown (11/6/2024)    Food Insecurity     Within the past 12 months, did you worry that your food would run out before you got money to buy more?: Patient  declined     Within the past 12 months, did the food you bought just not last and you didn t have money to get more?: Patient declined   Transportation Needs: Unknown (11/6/2024)    Transportation Needs     Within the past 12 months, has lack of transportation kept you from medical appointments, getting your medicines, non-medical meetings or appointments, work, or from getting things that you need?: Patient declined   Physical Activity: Inactive (11/6/2024)    Exercise Vital Sign     Days of Exercise per Week: 0 days     Minutes of Exercise per Session: 0 min   Stress: Stress Concern Present (11/6/2024)    [employer] - Occupational Stress Questionnaire     Feeling of Stress : To some extent   Social Connections: Unknown (11/6/2024)    Social Connection and Isolation Panel [NHANES]     Frequency of Communication with Friends and Family: Not on file     Frequency of Social Gatherings with Friends and Family: Twice a week     Attends Yazidism Services: Not on file     Active Member of Clubs or Organizations: Not on file     Attends Club or Organization Meetings: Not on file     Marital Status: Not on file   Interpersonal Safety: Low Risk  (4/14/2025)    Interpersonal Safety     Do you feel physically and emotionally safe where you currently live?: Yes     Within the past 12 months, have you been hit, slapped, kicked or otherwise physically hurt by someone?: No     Within the past 12 months, have you been humiliated or emotionally abused in other ways by your partner or ex-partner?: No   Housing Stability: Unknown (11/6/2024)    Housing Stability     Do you have housing? : Patient declined     Are you worried about losing your housing?: Patient declined       No current outpatient medications on file.       Medications and history reviewed    Physical exam:  Vitals: BP (!) 133/100 (BP Location: Left arm)   Pulse 78   Temp 97.9  F (36.6  C) (Oral)   Resp 16   SpO2 97%   BMI= There is  no height or weight on file to calculate BMI.    Constitutional: Healthy, alert, non-distressed   Head: Normo-cephalic, atraumatic, no lesions, masses or tenderness   Cardiovascular: RRR, no new murmurs, +S1, +S2 heart sounds, no clicks, rubs or gallops   Respiratory: CTAB, no rales, rhonchi or wheezing, equal chest rise, good respiratory effort   Gastrointestinal: Soft, non-tender, non distended, no rebound rigidity or guarding, no masses or hernias palpated   : Deferred  Musculoskeletal: Moves all extremities, normal  strength, no deformities noted   Skin: No suspicious lesions or rashes   Psychiatric: Mentation appears normal, affect appropriate   Hematologic/Lymphatic/Immunologic: Normal cervical and supraclavicular lymph nodes   Patient able to get up on table without difficulty.    Labs show:  No results found for this or any previous visit (from the past 24 hours).    Assessment: Endoscopy  Plan: Pt cleared for anesthesia for proposed procedure.    Spencer Keith, DO

## 2025-04-14 NOTE — ANESTHESIA PREPROCEDURE EVALUATION
Anesthesia Pre-Procedure Evaluation    Patient: Jf Valdez   MRN: [id number] : 1990        Procedure : Procedure(s):  Colonoscopy          No past medical history on file.   No past surgical history on file.   Allergies   Allergen Reactions     Pertussis Vaccines      Was told by his mother that he swelled up after given an injection with pertussis in it as a baby      Social History     Tobacco Use     Smoking status: Never     Smokeless tobacco: Never   Substance Use Topics     Alcohol use: Yes     Alcohol/week: 2.0 standard drinks of alcohol     Types: 2 Shots of liquor per week      Wt Readings from Last 1 Encounters:   25 83.9 kg (185 lb)        Anesthesia Evaluation   Pt has not had prior anesthetic         ROS/MED HX  ENT/Pulmonary:  - neg pulmonary ROS     Neurologic:  - neg neurologic ROS     Cardiovascular:  - neg cardiovascular ROS     METS/Exercise Tolerance:     Hematologic:  - neg hematologic  ROS     Musculoskeletal:  - neg musculoskeletal ROS     GI/Hepatic:     (+)        bowel prep,            Renal/Genitourinary:  - neg Renal ROS     Endo:  - neg endo ROS     Psychiatric/Substance Use:  - neg psychiatric ROS     Infectious Disease:  - neg infectious disease ROS     Malignancy:  - neg malignancy ROS     Other:  - neg other ROS        Physical Exam    Airway        Mallampati: I   TM distance: > 3 FB   Neck ROM: full   Mouth opening: > 3 cm    Respiratory Devices and Support         Dental       (+) Minor Abnormalities - some fillings, tiny chips      Cardiovascular             Pulmonary               OUTSIDE LABS:  CBC:   Lab Results   Component Value Date    WBC 6.4 2024    WBC 5.7 2023    HGB 15.3 2024    HGB 16.6 2023    HCT 44.8 2024    HCT 47.4 2023     2024     2023     BMP:   Lab Results   Component Value Date     2024     2023    POTASSIUM 4.4 2024    POTASSIUM 4.4 2023     "CHLORIDE 103 11/06/2024    CHLORIDE 103 06/08/2023    CO2 24 11/06/2024    CO2 25 06/08/2023    BUN 18.3 11/06/2024    BUN 19.5 06/08/2023    CR 1.04 11/06/2024    CR 1.04 06/08/2023     (H) 11/06/2024     (H) 06/08/2023     COAGS: No results found for: \"PTT\", \"INR\", \"FIBR\"  POC: No results found for: \"BGM\", \"HCG\", \"HCGS\"  HEPATIC:   Lab Results   Component Value Date    ALBUMIN 4.4 11/06/2024    PROTTOTAL 6.8 11/06/2024    ALT 13 11/06/2024    AST 21 11/06/2024    ALKPHOS 44 11/06/2024    BILITOTAL 0.6 11/06/2024     OTHER:   Lab Results   Component Value Date    DIRK 9.1 11/06/2024    TSH 2.32 11/06/2024       Anesthesia Plan    ASA Status:  1    NPO Status:  NPO Appropriate    Anesthesia Type: MAC.     - Reason for MAC: immobility needed   Induction: Propofol, Intravenous.   Maintenance: TIVA.        Consents    Anesthesia Plan(s) and associated risks, benefits, and realistic alternatives discussed. Questions answered and patient/representative(s) expressed understanding.     - Discussed:     - Discussed with:  Patient      - Extended Intubation/Ventilatory Support Discussed: No.      - Patient is DNR/DNI Status: No     Use of blood products discussed: No .     Postoperative Care            Comments:    Other Comments: The risks and benefits of anesthesia, and the alternatives where applicable, have been discussed with the patient, and they wish to proceed.            JOSÉ LUIS Ogden CRNA    Clinically Significant Risk Factors Present on Admission                                          " Star Wedge Flap Text: The defect edges were debeveled with a #15c scalpel blade.  Given the location of the defect, shape of the defect and the proximity to free margins a star wedge flap was deemed most appropriate.  Using a sterile surgical marker, an appropriate rotation flap was drawn incorporating the defect and placing the expected incisions within the relaxed skin tension lines where possible. The area thus outlined was incised deep to adipose tissue with a #15 scalpel blade.  The skin margins were undermined to an appropriate distance in all directions utilizing iris scissors.

## 2025-04-14 NOTE — ANESTHESIA POSTPROCEDURE EVALUATION
Patient: Jf Valdez    Procedure: Procedure(s):  Colonoscopy       Anesthesia Type:  MAC    Note:  Disposition: Outpatient   Postop Pain Control: Uneventful            Sign Out: Well controlled pain   PONV: No   Neuro/Psych: Uneventful            Sign Out: Acceptable/Baseline neuro status   Airway/Respiratory: Uneventful            Sign Out: Acceptable/Baseline resp. status   CV/Hemodynamics: Uneventful            Sign Out: Acceptable CV status   Other NRE: NONE   DID A NON-ROUTINE EVENT OCCUR? No    Event details/Postop Comments:  Pt was happy with anesthesia care.  No complications.  I will follow up with the pt if needed.       Last vitals:  Vitals Value Taken Time   /94 04/14/25 1600   Temp     Pulse 60 04/14/25 1600   Resp 16 04/14/25 1534   SpO2 98 % 04/14/25 1610   Vitals shown include unfiled device data.    Electronically Signed By: JOSÉ LUIS Ogden CRNA  April 14, 2025  5:51 PM

## 2025-04-14 NOTE — DISCHARGE INSTRUCTIONS
[facility]    Home Care Following Endoscopy          Activity:  You have just undergone an endoscopic procedure usually performed with conscious sedation.  Do not work or operate machinery (including a car) for at least 12 hours.    I encourage you to walk and attempt to pass this air as soon as possible.    Diet:  Return to the diet you were on before your procedure but eat lightly for the first 12-24 hours.  Drink plenty of water.  Resume any regular medications unless otherwise advised by your physician.  Please begin any new medication prescribed as a result of your procedure as directed by your physician.   If you had any biopsy or polyp removed please refrain from aspirin or aspirin products for 2 days.  If on Coumadin please restart as instructed by your physician.   Pain:  You may take Tylenol as needed for pain.  Expected Recovery:  You can expect some mild abdominal fullness and/or discomfort due to the air used to inflate your intestinal tract.     Call Your Physician if You Have:  After Colonoscopy:  Worsening persisting abdominal pain which is worse with activity.  Fevers (>101 degrees F), chills or shakes.  Passage of continued blood with bowel movements.     Any questions or concerns about your recovery, please call [phone] or after hours [phone] ([phone]) Nurse Advice Line.

## (undated) DEVICE — SOL WATER IRRIG 1000ML BOTTLE 2F7114

## (undated) DEVICE — KIT ENDO TURNOVER/PROCEDURE CARRY-ON 101822

## (undated) DEVICE — TUBING SUCTION 6"X3/16" N56A